# Patient Record
Sex: MALE | Race: ASIAN | NOT HISPANIC OR LATINO | URBAN - METROPOLITAN AREA
[De-identification: names, ages, dates, MRNs, and addresses within clinical notes are randomized per-mention and may not be internally consistent; named-entity substitution may affect disease eponyms.]

---

## 2020-01-31 ENCOUNTER — EMERGENCY (EMERGENCY)
Facility: HOSPITAL | Age: 45
LOS: 1 days | Discharge: LEFT BEFORE DISCHARGE | End: 2020-01-31
Attending: EMERGENCY MEDICINE | Admitting: EMERGENCY MEDICINE
Payer: COMMERCIAL

## 2020-01-31 VITALS
TEMPERATURE: 98 F | OXYGEN SATURATION: 98 % | SYSTOLIC BLOOD PRESSURE: 129 MMHG | RESPIRATION RATE: 16 BRPM | HEIGHT: 73 IN | HEART RATE: 88 BPM | WEIGHT: 214.95 LBS | DIASTOLIC BLOOD PRESSURE: 81 MMHG

## 2020-01-31 DIAGNOSIS — K85.90 ACUTE PANCREATITIS WITHOUT NECROSIS OR INFECTION, UNSPECIFIED: ICD-10-CM

## 2020-01-31 DIAGNOSIS — R10.9 UNSPECIFIED ABDOMINAL PAIN: ICD-10-CM

## 2020-01-31 LAB
CK MB BLD-MCNC: <0.37 % — SIGNIFICANT CHANGE UP
CK MB BLD-MCNC: <0.48 % — SIGNIFICANT CHANGE UP
CK MB CFR SERPL CALC: <0.5 NG/ML — LOW (ref 0.5–3.6)
CK MB CFR SERPL CALC: <0.5 NG/ML — LOW (ref 0.5–3.6)
CK SERPL-CCNC: 104 U/L — SIGNIFICANT CHANGE UP (ref 39–308)
CK SERPL-CCNC: 136 U/L — SIGNIFICANT CHANGE UP (ref 39–308)
TROPONIN I SERPL-MCNC: <0.017 NG/ML — LOW (ref 0.02–0.06)
TROPONIN I SERPL-MCNC: <0.017 NG/ML — LOW (ref 0.02–0.06)

## 2020-01-31 PROCEDURE — 93010 ELECTROCARDIOGRAM REPORT: CPT | Mod: 59

## 2020-01-31 PROCEDURE — 99218: CPT

## 2020-01-31 PROCEDURE — 76705 ECHO EXAM OF ABDOMEN: CPT | Mod: 26

## 2020-01-31 PROCEDURE — 74177 CT ABD & PELVIS W/CONTRAST: CPT | Mod: 26

## 2020-01-31 RX ORDER — MORPHINE SULFATE 50 MG/1
4 CAPSULE, EXTENDED RELEASE ORAL ONCE
Refills: 0 | Status: DISCONTINUED | OUTPATIENT
Start: 2020-01-31 | End: 2020-01-31

## 2020-01-31 RX ORDER — KETOROLAC TROMETHAMINE 30 MG/ML
30 SYRINGE (ML) INJECTION ONCE
Refills: 0 | Status: DISCONTINUED | OUTPATIENT
Start: 2020-01-31 | End: 2020-01-31

## 2020-01-31 RX ORDER — SODIUM CHLORIDE 9 MG/ML
1000 INJECTION INTRAMUSCULAR; INTRAVENOUS; SUBCUTANEOUS ONCE
Refills: 0 | Status: COMPLETED | OUTPATIENT
Start: 2020-01-31 | End: 2020-01-31

## 2020-01-31 RX ORDER — SODIUM CHLORIDE 9 MG/ML
1000 INJECTION INTRAMUSCULAR; INTRAVENOUS; SUBCUTANEOUS
Refills: 0 | Status: COMPLETED | OUTPATIENT
Start: 2020-01-31 | End: 2020-02-01

## 2020-01-31 RX ORDER — HYDROMORPHONE HYDROCHLORIDE 2 MG/ML
1 INJECTION INTRAMUSCULAR; INTRAVENOUS; SUBCUTANEOUS ONCE
Refills: 0 | Status: DISCONTINUED | OUTPATIENT
Start: 2020-01-31 | End: 2020-01-31

## 2020-01-31 RX ADMIN — MORPHINE SULFATE 4 MILLIGRAM(S): 50 CAPSULE, EXTENDED RELEASE ORAL at 18:23

## 2020-01-31 RX ADMIN — MORPHINE SULFATE 4 MILLIGRAM(S): 50 CAPSULE, EXTENDED RELEASE ORAL at 21:20

## 2020-01-31 RX ADMIN — SODIUM CHLORIDE 1000 MILLILITER(S): 9 INJECTION INTRAMUSCULAR; INTRAVENOUS; SUBCUTANEOUS at 18:23

## 2020-01-31 RX ADMIN — SODIUM CHLORIDE 1000 MILLILITER(S): 9 INJECTION INTRAMUSCULAR; INTRAVENOUS; SUBCUTANEOUS at 21:20

## 2020-01-31 NOTE — ED CDU PROVIDER INITIAL DAY NOTE - OBJECTIVE STATEMENT
44 y/o Male with a PMHx of pancreatitis in 2018 due to alcohol presents to the ED for epigastric and periumbilical pain. In 2018 when he was diagnosed with pancreatitis and was also diagnosed with a necrotizing pancreatitis. Few months later, he went back for a follow up CT scan and was cleared from any necrosis in the pancreatitis. In early January 2020 he had a bite of food and developed epigastric tenderness, nausea, and vomiting and attributed it to food poisoning; pain eventually subsided on its own with a bland diet. This week, he developed pain in the epigastric and periumbilical area x2 days, pain has been constant which prompted him to come into the ED. He had a bland diet to help resolve the pain but it did not. Denies fever, chills, nausea, vomiting, diarrhea, and recent alcohol consumption. In 2011, pt had a thyroidectomy.

## 2020-01-31 NOTE — ED PROVIDER NOTE - PROGRESS NOTE DETAILS
Spoke with Pt's PCP, Dr. La, who states patient's last EKG was in 2011 and did have T-Wave inversions in leads III and aVF. During this time in 2011, pt did have a stress test and was noted to have an elevated BP but attributed it to thyroiditis. PCP is aware of patient's case in the ED. pt with (+) pancreatitis on CT scan, pain poorly controlled in ED< multiple times he requested dc home but agreed to stay and be admitted to obs for re-eval in AM.  WIll keep NPO overnight, will discuss case with surgery on call. admit to observation for now.

## 2020-01-31 NOTE — ED PROVIDER NOTE - NS_EDPROVIDERDISPOUSERTYPE_ED_A_ED
Medical Students Attestation (For Medical Student USE Only).../Scribe Attestation (For Scribes USE Only)...

## 2020-01-31 NOTE — ED CDU PROVIDER INITIAL DAY NOTE - MEDICAL DECISION MAKING DETAILS
advised to admit for NPO status IVF and management of acute pancreatitis but pt declined admission, prefers observation instead, will re-eval in AM

## 2020-01-31 NOTE — ED PROVIDER NOTE - OBJECTIVE STATEMENT
46 y/o Male with a PMHx of pancreatitis in 2018 due to alcohol presents to the ED for epigastric and periumbilical pain. In 2018 when he was diagnosed with pancreatitis and was also diagnosed with a necrotizing pancreatitis. Few months later, he went back for a follow up CT scan and was cleared from any necrosis in the pancreatitis. In early January 2020 he had a bite of food and developed epigastric tenderness, nausea, and vomiting and attributed it to food poisoning; pain eventually subsided on its own with a bland diet. This week, he developed pain in the epigastric and periumbilical area x2 days, pain has been constant which prompted him to come into the ED. He had a bland diet to help resolve the pain but it did not. Denies fever, chills, nausea, vomiting, diarrhea, and recent alcohol consumption. In 2011, pt had a thyroidectomy.

## 2020-01-31 NOTE — ED ADULT NURSE NOTE - NSIMPLEMENTINTERV_GEN_ALL_ED
Implemented All Universal Safety Interventions:  [place] to call system. Call bell, personal items and telephone within reach. Instruct patient to call for assistance. Room bathroom lighting operational. Non-slip footwear when patient is off stretcher. Physically safe environment: no spills, clutter or unnecessary equipment. Stretcher in lowest position, wheels locked, appropriate side rails in place.

## 2020-01-31 NOTE — ED PROVIDER NOTE - CLINICAL SUMMARY MEDICAL DECISION MAKING FREE TEXT BOX
46 y/o Male here with constant epigastric tenderness x2 days. Pt with a history of pancreatitis in 2018 related to alcohol. On exam, patient has diffuse abdominal tenderness. Will order morphine for pain control, labs, US, and EKG. Will reassess.    Spoke with Pt's PCP, Dr. La, who states patient's last EKG was in 2011 and did have T-Wave inversions in leads III and aVF. During this time in 2011, pt did have a stress test and was noted to have an elevated BP but attributed it to thyroiditis. PCP is aware of patient's case in the ED.

## 2020-01-31 NOTE — ED ADULT NURSE REASSESSMENT NOTE - NS ED NURSE REASSESS COMMENT FT1
Received patient from KARI Wise. Patient resting in stretcher, endorsing lower abdominal pain. Given 4mg IV morphine per provider order, will reassess pain. Denies n/v, cp, fevers, chills at this time. Pending CT results, plan of care explained.

## 2020-02-01 VITALS
TEMPERATURE: 99 F | SYSTOLIC BLOOD PRESSURE: 128 MMHG | OXYGEN SATURATION: 97 % | HEART RATE: 88 BPM | DIASTOLIC BLOOD PRESSURE: 78 MMHG | RESPIRATION RATE: 18 BRPM

## 2020-02-01 PROCEDURE — 99217: CPT

## 2020-02-01 RX ADMIN — SODIUM CHLORIDE 1000 MILLILITER(S): 9 INJECTION INTRAMUSCULAR; INTRAVENOUS; SUBCUTANEOUS at 02:20

## 2020-02-01 RX ADMIN — SODIUM CHLORIDE 1000 MILLILITER(S): 9 INJECTION INTRAMUSCULAR; INTRAVENOUS; SUBCUTANEOUS at 04:44

## 2020-02-01 RX ADMIN — SODIUM CHLORIDE 250 MILLILITER(S): 9 INJECTION INTRAMUSCULAR; INTRAVENOUS; SUBCUTANEOUS at 03:36

## 2020-02-01 RX ADMIN — SODIUM CHLORIDE 1000 MILLILITER(S): 9 INJECTION INTRAMUSCULAR; INTRAVENOUS; SUBCUTANEOUS at 04:45

## 2020-02-01 RX ADMIN — SODIUM CHLORIDE 250 MILLILITER(S): 9 INJECTION INTRAMUSCULAR; INTRAVENOUS; SUBCUTANEOUS at 00:14

## 2020-02-01 RX ADMIN — SODIUM CHLORIDE 250 MILLILITER(S): 9 INJECTION INTRAMUSCULAR; INTRAVENOUS; SUBCUTANEOUS at 02:36

## 2020-02-01 RX ADMIN — MORPHINE SULFATE 4 MILLIGRAM(S): 50 CAPSULE, EXTENDED RELEASE ORAL at 02:19

## 2020-02-01 RX ADMIN — Medication 30 MILLIGRAM(S): at 02:19

## 2020-02-01 RX ADMIN — Medication 30 MILLIGRAM(S): at 00:14

## 2020-02-01 RX ADMIN — SODIUM CHLORIDE 250 MILLILITER(S): 9 INJECTION INTRAMUSCULAR; INTRAVENOUS; SUBCUTANEOUS at 01:36

## 2020-02-01 NOTE — ED ADULT NURSE REASSESSMENT NOTE - NS ED NURSE REASSESS COMMENT FT1
Fluids completed. Patient states he is feeling much better. No pain or distress noted. Safety and comfort measures in place and maintained.

## 2020-02-01 NOTE — ED CDU PROVIDER DISPOSITION NOTE - NSFOLLOWUPINSTRUCTIONS_ED_ALL_ED_FT
Follow up with your primary care doctor in 1-2 days for follow up eval or return here for repeat evaluation  It is recommended that you stay for admission for your pancreatitis and be monitored on your diet regimen as what you eat/don't eat is particularly important when you have pancreatitis.  Typically, when you have pancreatitis, you need to limit the amount of food intake by mouth to let your bowel rest.  When you feel improved, you can start with small amount of clear liquid, such as water, pedialyte, gatorade for at least 24-48 hours.  Then you can slowly advance your diet (meaning you can try pudding, jello, clear broth).  If you continue to feel better, you can add more food groups into your diet but generally you should avoid greasy food, fatty food, spicy food as they can worsen your symptoms.  Return immediately for any new or worsening symptoms or any new concerns

## 2020-02-01 NOTE — ED CDU PROVIDER DISPOSITION NOTE - CLINICAL COURSE
pt placed on obs for pancreatitis, feeling improved in the AM, tolerating PO in the AM (sips of water).  Pt informed of all lab and radiology results, demonstrates understanding of results.  Pt wants to be discharged despite being advised STRONGLY and REPEATEDLY to stay for admission.  A&Ox3, speaking clearly and coherently, demonstrates decision-making capacity.  Pt understands that the risks of leaving include, but not limited to, death and/or disability; pt accepts these risks.  Pt understands to be seen by PMD in 1-2 days; pt understands to return as soon as possible for persistence or any worsening of symptoms. Pt given opportunity to ask any and all questions.  Pt understands that the ER diagnosis being given today is a preliminary one, and, like many ER diagnoses, is a preliminary impression, often based on limited information, that may change as current Sx's evolve or new Sx's develop

## 2020-02-01 NOTE — ED ADULT NURSE REASSESSMENT NOTE - NS ED NURSE REASSESS COMMENT FT1
Patient states "I feel much better. I wish to go home". Patient is AOx4, in nad, respirations even bilaterally. VSS, refusing admission at this time. Verbalizes understanding of risks and benefits of leaving AMA, states "I understand". Also verbalizes return criteria, endorses will f/u with GI doctor. AMA paperwork signed and scanned.

## 2020-02-01 NOTE — ED CDU PROVIDER DISPOSITION NOTE - PATIENT PORTAL LINK FT
You can access the FollowMyHealth Patient Portal offered by [facility] by registering at the following website: [email or web address]. By joining [employer]’s FollowMyHealth portal, you will also be able to view your health information using other applications (apps) compatible with our system.

## 2021-03-02 NOTE — ED PROVIDER NOTE - CPE EDP ENMT NORM
Next appt 7-23-21  Last appt 12-16-20    Refill request for   Disp Refills Start End    trazodone (DESYREL) 150 MG tablet 30 tablet 3 10/28/2020     Sig - Route: Take 1 tablet by mouth at bedtime. - Oral      Refilled per standing med protocol.     normal...

## 2023-12-15 ENCOUNTER — INPATIENT (INPATIENT)
Facility: HOSPITAL | Age: 48
LOS: 3 days | Discharge: HOME | DRG: 439 | End: 2023-12-19
Attending: HOSPITALIST | Admitting: STUDENT IN AN ORGANIZED HEALTH CARE EDUCATION/TRAINING PROGRAM
Payer: COMMERCIAL

## 2023-12-15 VITALS
HEART RATE: 113 BPM | WEIGHT: 225.09 LBS | RESPIRATION RATE: 16 BRPM | DIASTOLIC BLOOD PRESSURE: 101 MMHG | TEMPERATURE: 98 F | HEIGHT: 74 IN | OXYGEN SATURATION: 96 % | SYSTOLIC BLOOD PRESSURE: 151 MMHG

## 2023-12-15 DIAGNOSIS — K85.90 ACUTE PANCREATITIS WITHOUT NECROSIS OR INFECTION, UNSPECIFIED: ICD-10-CM

## 2023-12-15 DIAGNOSIS — F41.9 ANXIETY DISORDER, UNSPECIFIED: ICD-10-CM

## 2023-12-15 DIAGNOSIS — E78.5 HYPERLIPIDEMIA, UNSPECIFIED: ICD-10-CM

## 2023-12-15 DIAGNOSIS — E89.0 POSTPROCEDURAL HYPOTHYROIDISM: Chronic | ICD-10-CM

## 2023-12-15 DIAGNOSIS — I10 ESSENTIAL (PRIMARY) HYPERTENSION: ICD-10-CM

## 2023-12-15 DIAGNOSIS — E03.9 HYPOTHYROIDISM, UNSPECIFIED: ICD-10-CM

## 2023-12-15 DIAGNOSIS — F10.10 ALCOHOL ABUSE, UNCOMPLICATED: ICD-10-CM

## 2023-12-15 DIAGNOSIS — K76.0 FATTY (CHANGE OF) LIVER, NOT ELSEWHERE CLASSIFIED: ICD-10-CM

## 2023-12-15 LAB
ALBUMIN SERPL ELPH-MCNC: 3.8 G/DL — SIGNIFICANT CHANGE UP (ref 3.3–5)
ALBUMIN SERPL ELPH-MCNC: 3.8 G/DL — SIGNIFICANT CHANGE UP (ref 3.3–5)
ALBUMIN SERPL ELPH-MCNC: 4.2 G/DL — SIGNIFICANT CHANGE UP (ref 3.4–5)
ALBUMIN SERPL ELPH-MCNC: 4.2 G/DL — SIGNIFICANT CHANGE UP (ref 3.4–5)
ALP SERPL-CCNC: 64 U/L — SIGNIFICANT CHANGE UP (ref 40–120)
ALP SERPL-CCNC: 64 U/L — SIGNIFICANT CHANGE UP (ref 40–120)
ALP SERPL-CCNC: 87 U/L — SIGNIFICANT CHANGE UP (ref 40–120)
ALP SERPL-CCNC: 87 U/L — SIGNIFICANT CHANGE UP (ref 40–120)
ALT FLD-CCNC: 21 U/L — SIGNIFICANT CHANGE UP (ref 10–45)
ALT FLD-CCNC: 21 U/L — SIGNIFICANT CHANGE UP (ref 10–45)
ALT FLD-CCNC: SIGNIFICANT CHANGE UP U/L (ref 12–42)
ALT FLD-CCNC: SIGNIFICANT CHANGE UP U/L (ref 12–42)
ANION GAP SERPL CALC-SCNC: 15 MMOL/L — SIGNIFICANT CHANGE UP (ref 5–17)
ANION GAP SERPL CALC-SCNC: 15 MMOL/L — SIGNIFICANT CHANGE UP (ref 5–17)
ANION GAP SERPL CALC-SCNC: 15 MMOL/L — SIGNIFICANT CHANGE UP (ref 9–16)
ANION GAP SERPL CALC-SCNC: 15 MMOL/L — SIGNIFICANT CHANGE UP (ref 9–16)
APPEARANCE UR: CLEAR — SIGNIFICANT CHANGE UP
APPEARANCE UR: CLEAR — SIGNIFICANT CHANGE UP
AST SERPL-CCNC: 32 U/L — SIGNIFICANT CHANGE UP (ref 10–40)
AST SERPL-CCNC: 32 U/L — SIGNIFICANT CHANGE UP (ref 10–40)
AST SERPL-CCNC: SIGNIFICANT CHANGE UP U/L (ref 15–37)
AST SERPL-CCNC: SIGNIFICANT CHANGE UP U/L (ref 15–37)
BASOPHILS # BLD AUTO: 0.06 K/UL — SIGNIFICANT CHANGE UP (ref 0–0.2)
BASOPHILS # BLD AUTO: 0.06 K/UL — SIGNIFICANT CHANGE UP (ref 0–0.2)
BASOPHILS NFR BLD AUTO: 0.4 % — SIGNIFICANT CHANGE UP (ref 0–2)
BASOPHILS NFR BLD AUTO: 0.4 % — SIGNIFICANT CHANGE UP (ref 0–2)
BILIRUB SERPL-MCNC: 1.2 MG/DL — SIGNIFICANT CHANGE UP (ref 0.2–1.2)
BILIRUB SERPL-MCNC: 1.2 MG/DL — SIGNIFICANT CHANGE UP (ref 0.2–1.2)
BILIRUB SERPL-MCNC: 1.4 MG/DL — HIGH (ref 0.2–1.2)
BILIRUB SERPL-MCNC: 1.4 MG/DL — HIGH (ref 0.2–1.2)
BILIRUB UR-MCNC: ABNORMAL
BILIRUB UR-MCNC: ABNORMAL
BUN SERPL-MCNC: 13 MG/DL — SIGNIFICANT CHANGE UP (ref 7–23)
BUN SERPL-MCNC: 13 MG/DL — SIGNIFICANT CHANGE UP (ref 7–23)
BUN SERPL-MCNC: 16 MG/DL — SIGNIFICANT CHANGE UP (ref 7–23)
BUN SERPL-MCNC: 16 MG/DL — SIGNIFICANT CHANGE UP (ref 7–23)
CALCIUM SERPL-MCNC: 7.7 MG/DL — LOW (ref 8.4–10.5)
CALCIUM SERPL-MCNC: 7.7 MG/DL — LOW (ref 8.4–10.5)
CALCIUM SERPL-MCNC: 8.5 MG/DL — SIGNIFICANT CHANGE UP (ref 8.5–10.5)
CALCIUM SERPL-MCNC: 8.5 MG/DL — SIGNIFICANT CHANGE UP (ref 8.5–10.5)
CHLORIDE SERPL-SCNC: 93 MMOL/L — LOW (ref 96–108)
CHLORIDE SERPL-SCNC: 93 MMOL/L — LOW (ref 96–108)
CHLORIDE SERPL-SCNC: 98 MMOL/L — SIGNIFICANT CHANGE UP (ref 96–108)
CHLORIDE SERPL-SCNC: 98 MMOL/L — SIGNIFICANT CHANGE UP (ref 96–108)
CHOLEST SERPL-MCNC: 346 MG/DL — HIGH
CHOLEST SERPL-MCNC: 346 MG/DL — HIGH
CK MB BLD-MCNC: <0.49 % — SIGNIFICANT CHANGE UP
CK MB BLD-MCNC: <0.49 % — SIGNIFICANT CHANGE UP
CK MB CFR SERPL CALC: <0.5 NG/ML — LOW (ref 0.5–3.6)
CK MB CFR SERPL CALC: <0.5 NG/ML — LOW (ref 0.5–3.6)
CK SERPL-CCNC: 103 U/L — SIGNIFICANT CHANGE UP (ref 39–308)
CK SERPL-CCNC: 103 U/L — SIGNIFICANT CHANGE UP (ref 39–308)
CO2 SERPL-SCNC: 20 MMOL/L — LOW (ref 22–31)
CO2 SERPL-SCNC: 20 MMOL/L — LOW (ref 22–31)
CO2 SERPL-SCNC: 21 MMOL/L — LOW (ref 22–31)
CO2 SERPL-SCNC: 21 MMOL/L — LOW (ref 22–31)
COLOR SPEC: YELLOW — SIGNIFICANT CHANGE UP
COLOR SPEC: YELLOW — SIGNIFICANT CHANGE UP
CREAT SERPL-MCNC: 0.83 MG/DL — SIGNIFICANT CHANGE UP (ref 0.5–1.3)
CREAT SERPL-MCNC: 0.83 MG/DL — SIGNIFICANT CHANGE UP (ref 0.5–1.3)
CREAT SERPL-MCNC: 0.93 MG/DL — SIGNIFICANT CHANGE UP (ref 0.5–1.3)
CREAT SERPL-MCNC: 0.93 MG/DL — SIGNIFICANT CHANGE UP (ref 0.5–1.3)
DIFF PNL FLD: ABNORMAL
DIFF PNL FLD: ABNORMAL
EGFR: 101 ML/MIN/1.73M2 — SIGNIFICANT CHANGE UP
EGFR: 101 ML/MIN/1.73M2 — SIGNIFICANT CHANGE UP
EGFR: 107 ML/MIN/1.73M2 — SIGNIFICANT CHANGE UP
EGFR: 107 ML/MIN/1.73M2 — SIGNIFICANT CHANGE UP
EOSINOPHIL # BLD AUTO: 0.02 K/UL — SIGNIFICANT CHANGE UP (ref 0–0.5)
EOSINOPHIL # BLD AUTO: 0.02 K/UL — SIGNIFICANT CHANGE UP (ref 0–0.5)
EOSINOPHIL NFR BLD AUTO: 0.1 % — SIGNIFICANT CHANGE UP (ref 0–6)
EOSINOPHIL NFR BLD AUTO: 0.1 % — SIGNIFICANT CHANGE UP (ref 0–6)
ETHANOL SERPL-MCNC: <3 MG/DL — SIGNIFICANT CHANGE UP
ETHANOL SERPL-MCNC: <3 MG/DL — SIGNIFICANT CHANGE UP
GLUCOSE SERPL-MCNC: 135 MG/DL — HIGH (ref 70–99)
GLUCOSE SERPL-MCNC: 135 MG/DL — HIGH (ref 70–99)
GLUCOSE SERPL-MCNC: 177 MG/DL — HIGH (ref 70–99)
GLUCOSE SERPL-MCNC: 177 MG/DL — HIGH (ref 70–99)
GLUCOSE UR QL: NEGATIVE MG/DL — SIGNIFICANT CHANGE UP
GLUCOSE UR QL: NEGATIVE MG/DL — SIGNIFICANT CHANGE UP
HCT VFR BLD CALC: 44.7 % — SIGNIFICANT CHANGE UP (ref 39–50)
HCT VFR BLD CALC: 44.7 % — SIGNIFICANT CHANGE UP (ref 39–50)
HCT VFR BLD CALC: 48.8 % — SIGNIFICANT CHANGE UP (ref 39–50)
HCT VFR BLD CALC: 48.8 % — SIGNIFICANT CHANGE UP (ref 39–50)
HDLC SERPL-MCNC: 37 MG/DL — LOW
HDLC SERPL-MCNC: 37 MG/DL — LOW
HGB BLD-MCNC: 15.2 G/DL — SIGNIFICANT CHANGE UP (ref 13–17)
HGB BLD-MCNC: 15.2 G/DL — SIGNIFICANT CHANGE UP (ref 13–17)
HGB BLD-MCNC: 17 G/DL — SIGNIFICANT CHANGE UP (ref 13–17)
HGB BLD-MCNC: 17 G/DL — SIGNIFICANT CHANGE UP (ref 13–17)
IMM GRANULOCYTES NFR BLD AUTO: 0.4 % — SIGNIFICANT CHANGE UP (ref 0–0.9)
IMM GRANULOCYTES NFR BLD AUTO: 0.4 % — SIGNIFICANT CHANGE UP (ref 0–0.9)
KETONES UR-MCNC: 40 MG/DL
KETONES UR-MCNC: 40 MG/DL
LACTATE SERPL-SCNC: 1.6 MMOL/L — SIGNIFICANT CHANGE UP (ref 0.5–2)
LACTATE SERPL-SCNC: 1.6 MMOL/L — SIGNIFICANT CHANGE UP (ref 0.5–2)
LEUKOCYTE ESTERASE UR-ACNC: NEGATIVE — SIGNIFICANT CHANGE UP
LEUKOCYTE ESTERASE UR-ACNC: NEGATIVE — SIGNIFICANT CHANGE UP
LIDOCAIN IGE QN: >375 U/L — HIGH (ref 16–77)
LIDOCAIN IGE QN: >375 U/L — HIGH (ref 16–77)
LIPID PNL WITH DIRECT LDL SERPL: SIGNIFICANT CHANGE UP MG/DL
LIPID PNL WITH DIRECT LDL SERPL: SIGNIFICANT CHANGE UP MG/DL
LYMPHOCYTES # BLD AUTO: 0.83 K/UL — LOW (ref 1–3.3)
LYMPHOCYTES # BLD AUTO: 0.83 K/UL — LOW (ref 1–3.3)
LYMPHOCYTES # BLD AUTO: 5.1 % — LOW (ref 13–44)
LYMPHOCYTES # BLD AUTO: 5.1 % — LOW (ref 13–44)
MCHC RBC-ENTMCNC: 31 PG — SIGNIFICANT CHANGE UP (ref 27–34)
MCHC RBC-ENTMCNC: 31 PG — SIGNIFICANT CHANGE UP (ref 27–34)
MCHC RBC-ENTMCNC: 32.1 PG — SIGNIFICANT CHANGE UP (ref 27–34)
MCHC RBC-ENTMCNC: 32.1 PG — SIGNIFICANT CHANGE UP (ref 27–34)
MCHC RBC-ENTMCNC: 34 GM/DL — SIGNIFICANT CHANGE UP (ref 32–36)
MCHC RBC-ENTMCNC: 34 GM/DL — SIGNIFICANT CHANGE UP (ref 32–36)
MCHC RBC-ENTMCNC: 34.8 GM/DL — SIGNIFICANT CHANGE UP (ref 32–36)
MCHC RBC-ENTMCNC: 34.8 GM/DL — SIGNIFICANT CHANGE UP (ref 32–36)
MCV RBC AUTO: 91.2 FL — SIGNIFICANT CHANGE UP (ref 80–100)
MCV RBC AUTO: 91.2 FL — SIGNIFICANT CHANGE UP (ref 80–100)
MCV RBC AUTO: 92.2 FL — SIGNIFICANT CHANGE UP (ref 80–100)
MCV RBC AUTO: 92.2 FL — SIGNIFICANT CHANGE UP (ref 80–100)
MONOCYTES # BLD AUTO: 1.24 K/UL — HIGH (ref 0–0.9)
MONOCYTES # BLD AUTO: 1.24 K/UL — HIGH (ref 0–0.9)
MONOCYTES NFR BLD AUTO: 7.7 % — SIGNIFICANT CHANGE UP (ref 2–14)
MONOCYTES NFR BLD AUTO: 7.7 % — SIGNIFICANT CHANGE UP (ref 2–14)
NEUTROPHILS # BLD AUTO: 13.91 K/UL — HIGH (ref 1.8–7.4)
NEUTROPHILS # BLD AUTO: 13.91 K/UL — HIGH (ref 1.8–7.4)
NEUTROPHILS NFR BLD AUTO: 86.3 % — HIGH (ref 43–77)
NEUTROPHILS NFR BLD AUTO: 86.3 % — HIGH (ref 43–77)
NITRITE UR-MCNC: NEGATIVE — SIGNIFICANT CHANGE UP
NITRITE UR-MCNC: NEGATIVE — SIGNIFICANT CHANGE UP
NON HDL CHOLESTEROL: 309 MG/DL — HIGH
NON HDL CHOLESTEROL: 309 MG/DL — HIGH
NRBC # BLD: 0 /100 WBCS — SIGNIFICANT CHANGE UP (ref 0–0)
PH UR: 5.5 — SIGNIFICANT CHANGE UP (ref 5–8)
PH UR: 5.5 — SIGNIFICANT CHANGE UP (ref 5–8)
PLATELET # BLD AUTO: 230 K/UL — SIGNIFICANT CHANGE UP (ref 150–400)
PLATELET # BLD AUTO: 230 K/UL — SIGNIFICANT CHANGE UP (ref 150–400)
PLATELET # BLD AUTO: 321 K/UL — SIGNIFICANT CHANGE UP (ref 150–400)
PLATELET # BLD AUTO: 321 K/UL — SIGNIFICANT CHANGE UP (ref 150–400)
POTASSIUM SERPL-MCNC: 3.7 MMOL/L — SIGNIFICANT CHANGE UP (ref 3.5–5.3)
POTASSIUM SERPL-MCNC: 3.7 MMOL/L — SIGNIFICANT CHANGE UP (ref 3.5–5.3)
POTASSIUM SERPL-MCNC: 4 MMOL/L — SIGNIFICANT CHANGE UP (ref 3.5–5.3)
POTASSIUM SERPL-MCNC: 4 MMOL/L — SIGNIFICANT CHANGE UP (ref 3.5–5.3)
POTASSIUM SERPL-SCNC: 3.7 MMOL/L — SIGNIFICANT CHANGE UP (ref 3.5–5.3)
POTASSIUM SERPL-SCNC: 3.7 MMOL/L — SIGNIFICANT CHANGE UP (ref 3.5–5.3)
POTASSIUM SERPL-SCNC: 4 MMOL/L — SIGNIFICANT CHANGE UP (ref 3.5–5.3)
POTASSIUM SERPL-SCNC: 4 MMOL/L — SIGNIFICANT CHANGE UP (ref 3.5–5.3)
PROT SERPL-MCNC: 7 G/DL — SIGNIFICANT CHANGE UP (ref 6–8.3)
PROT SERPL-MCNC: 7 G/DL — SIGNIFICANT CHANGE UP (ref 6–8.3)
PROT SERPL-MCNC: 9.4 G/DL — HIGH (ref 6.4–8.2)
PROT SERPL-MCNC: 9.4 G/DL — HIGH (ref 6.4–8.2)
PROT UR-MCNC: 100 MG/DL
PROT UR-MCNC: 100 MG/DL
RBC # BLD: 4.9 M/UL — SIGNIFICANT CHANGE UP (ref 4.2–5.8)
RBC # BLD: 4.9 M/UL — SIGNIFICANT CHANGE UP (ref 4.2–5.8)
RBC # BLD: 5.29 M/UL — SIGNIFICANT CHANGE UP (ref 4.2–5.8)
RBC # BLD: 5.29 M/UL — SIGNIFICANT CHANGE UP (ref 4.2–5.8)
RBC # FLD: 13.3 % — SIGNIFICANT CHANGE UP (ref 10.3–14.5)
RBC # FLD: 13.3 % — SIGNIFICANT CHANGE UP (ref 10.3–14.5)
RBC # FLD: 13.8 % — SIGNIFICANT CHANGE UP (ref 10.3–14.5)
RBC # FLD: 13.8 % — SIGNIFICANT CHANGE UP (ref 10.3–14.5)
RBC CASTS # UR COMP ASSIST: 6 /HPF — HIGH (ref 0–4)
RBC CASTS # UR COMP ASSIST: 6 /HPF — HIGH (ref 0–4)
SODIUM SERPL-SCNC: 129 MMOL/L — LOW (ref 132–145)
SODIUM SERPL-SCNC: 129 MMOL/L — LOW (ref 132–145)
SODIUM SERPL-SCNC: 133 MMOL/L — LOW (ref 135–145)
SODIUM SERPL-SCNC: 133 MMOL/L — LOW (ref 135–145)
SP GR SPEC: 1.01 — SIGNIFICANT CHANGE UP (ref 1–1.03)
SP GR SPEC: 1.01 — SIGNIFICANT CHANGE UP (ref 1–1.03)
TRIGL SERPL-MCNC: 1660 MG/DL — HIGH
TRIGL SERPL-MCNC: 1660 MG/DL — HIGH
TROPONIN I, HIGH SENSITIVITY RESULT: 21.8 NG/L — SIGNIFICANT CHANGE UP
TROPONIN I, HIGH SENSITIVITY RESULT: 21.8 NG/L — SIGNIFICANT CHANGE UP
TROPONIN I, HIGH SENSITIVITY RESULT: 25.2 NG/L — SIGNIFICANT CHANGE UP
TROPONIN I, HIGH SENSITIVITY RESULT: 25.2 NG/L — SIGNIFICANT CHANGE UP
UROBILINOGEN FLD QL: 0.2 MG/DL — SIGNIFICANT CHANGE UP (ref 0.2–1)
UROBILINOGEN FLD QL: 0.2 MG/DL — SIGNIFICANT CHANGE UP (ref 0.2–1)
WBC # BLD: 14.03 K/UL — HIGH (ref 3.8–10.5)
WBC # BLD: 14.03 K/UL — HIGH (ref 3.8–10.5)
WBC # BLD: 16.12 K/UL — HIGH (ref 3.8–10.5)
WBC # BLD: 16.12 K/UL — HIGH (ref 3.8–10.5)
WBC # FLD AUTO: 14.03 K/UL — HIGH (ref 3.8–10.5)
WBC # FLD AUTO: 14.03 K/UL — HIGH (ref 3.8–10.5)
WBC # FLD AUTO: 16.12 K/UL — HIGH (ref 3.8–10.5)
WBC # FLD AUTO: 16.12 K/UL — HIGH (ref 3.8–10.5)
WBC UR QL: 1 /HPF — SIGNIFICANT CHANGE UP (ref 0–5)
WBC UR QL: 1 /HPF — SIGNIFICANT CHANGE UP (ref 0–5)

## 2023-12-15 PROCEDURE — 71045 X-RAY EXAM CHEST 1 VIEW: CPT | Mod: 26

## 2023-12-15 PROCEDURE — 99222 1ST HOSP IP/OBS MODERATE 55: CPT

## 2023-12-15 PROCEDURE — 99285 EMERGENCY DEPT VISIT HI MDM: CPT

## 2023-12-15 PROCEDURE — 74177 CT ABD & PELVIS W/CONTRAST: CPT | Mod: 26

## 2023-12-15 RX ORDER — SODIUM CHLORIDE 9 MG/ML
1000 INJECTION INTRAMUSCULAR; INTRAVENOUS; SUBCUTANEOUS
Refills: 0 | Status: DISCONTINUED | OUTPATIENT
Start: 2023-12-15 | End: 2023-12-16

## 2023-12-15 RX ORDER — HYDROMORPHONE HYDROCHLORIDE 2 MG/ML
1 INJECTION INTRAMUSCULAR; INTRAVENOUS; SUBCUTANEOUS ONCE
Refills: 0 | Status: DISCONTINUED | OUTPATIENT
Start: 2023-12-15 | End: 2023-12-15

## 2023-12-15 RX ORDER — MORPHINE SULFATE 50 MG/1
4 CAPSULE, EXTENDED RELEASE ORAL EVERY 4 HOURS
Refills: 0 | Status: DISCONTINUED | OUTPATIENT
Start: 2023-12-15 | End: 2023-12-15

## 2023-12-15 RX ORDER — LANOLIN ALCOHOL/MO/W.PET/CERES
3 CREAM (GRAM) TOPICAL AT BEDTIME
Refills: 0 | Status: DISCONTINUED | OUTPATIENT
Start: 2023-12-15 | End: 2023-12-19

## 2023-12-15 RX ORDER — MORPHINE SULFATE 50 MG/1
4 CAPSULE, EXTENDED RELEASE ORAL ONCE
Refills: 0 | Status: DISCONTINUED | OUTPATIENT
Start: 2023-12-15 | End: 2023-12-15

## 2023-12-15 RX ORDER — IOHEXOL 300 MG/ML
30 INJECTION, SOLUTION INTRAVENOUS ONCE
Refills: 0 | Status: COMPLETED | OUTPATIENT
Start: 2023-12-15 | End: 2023-12-15

## 2023-12-15 RX ORDER — SODIUM CHLORIDE 9 MG/ML
1000 INJECTION INTRAMUSCULAR; INTRAVENOUS; SUBCUTANEOUS ONCE
Refills: 0 | Status: COMPLETED | OUTPATIENT
Start: 2023-12-15 | End: 2023-12-15

## 2023-12-15 RX ORDER — MORPHINE SULFATE 50 MG/1
4 CAPSULE, EXTENDED RELEASE ORAL EVERY 4 HOURS
Refills: 0 | Status: DISCONTINUED | OUTPATIENT
Start: 2023-12-15 | End: 2023-12-18

## 2023-12-15 RX ORDER — ENOXAPARIN SODIUM 100 MG/ML
40 INJECTION SUBCUTANEOUS EVERY 24 HOURS
Refills: 0 | Status: DISCONTINUED | OUTPATIENT
Start: 2023-12-16 | End: 2023-12-19

## 2023-12-15 RX ORDER — HYDROMORPHONE HYDROCHLORIDE 2 MG/ML
0.5 INJECTION INTRAMUSCULAR; INTRAVENOUS; SUBCUTANEOUS ONCE
Refills: 0 | Status: DISCONTINUED | OUTPATIENT
Start: 2023-12-15 | End: 2023-12-15

## 2023-12-15 RX ORDER — INFLUENZA VIRUS VACCINE 15; 15; 15; 15 UG/.5ML; UG/.5ML; UG/.5ML; UG/.5ML
0.5 SUSPENSION INTRAMUSCULAR ONCE
Refills: 0 | Status: DISCONTINUED | OUTPATIENT
Start: 2023-12-15 | End: 2023-12-19

## 2023-12-15 RX ORDER — LEVOTHYROXINE SODIUM 125 MCG
150 TABLET ORAL DAILY
Refills: 0 | Status: DISCONTINUED | OUTPATIENT
Start: 2023-12-16 | End: 2023-12-19

## 2023-12-15 RX ORDER — ONDANSETRON 8 MG/1
4 TABLET, FILM COATED ORAL ONCE
Refills: 0 | Status: COMPLETED | OUTPATIENT
Start: 2023-12-15 | End: 2023-12-15

## 2023-12-15 RX ADMIN — SODIUM CHLORIDE 1000 MILLILITER(S): 9 INJECTION INTRAMUSCULAR; INTRAVENOUS; SUBCUTANEOUS at 07:48

## 2023-12-15 RX ADMIN — SODIUM CHLORIDE 1000 MILLILITER(S): 9 INJECTION INTRAMUSCULAR; INTRAVENOUS; SUBCUTANEOUS at 17:06

## 2023-12-15 RX ADMIN — SODIUM CHLORIDE 1000 MILLILITER(S): 9 INJECTION INTRAMUSCULAR; INTRAVENOUS; SUBCUTANEOUS at 05:07

## 2023-12-15 RX ADMIN — HYDROMORPHONE HYDROCHLORIDE 1 MILLIGRAM(S): 2 INJECTION INTRAMUSCULAR; INTRAVENOUS; SUBCUTANEOUS at 10:01

## 2023-12-15 RX ADMIN — HYDROMORPHONE HYDROCHLORIDE 1 MILLIGRAM(S): 2 INJECTION INTRAMUSCULAR; INTRAVENOUS; SUBCUTANEOUS at 17:06

## 2023-12-15 RX ADMIN — HYDROMORPHONE HYDROCHLORIDE 1 MILLIGRAM(S): 2 INJECTION INTRAMUSCULAR; INTRAVENOUS; SUBCUTANEOUS at 15:58

## 2023-12-15 RX ADMIN — HYDROMORPHONE HYDROCHLORIDE 1 MILLIGRAM(S): 2 INJECTION INTRAMUSCULAR; INTRAVENOUS; SUBCUTANEOUS at 12:44

## 2023-12-15 RX ADMIN — SODIUM CHLORIDE 150 MILLILITER(S): 9 INJECTION INTRAMUSCULAR; INTRAVENOUS; SUBCUTANEOUS at 21:18

## 2023-12-15 RX ADMIN — ONDANSETRON 4 MILLIGRAM(S): 8 TABLET, FILM COATED ORAL at 05:28

## 2023-12-15 RX ADMIN — MORPHINE SULFATE 4 MILLIGRAM(S): 50 CAPSULE, EXTENDED RELEASE ORAL at 05:07

## 2023-12-15 RX ADMIN — MORPHINE SULFATE 4 MILLIGRAM(S): 50 CAPSULE, EXTENDED RELEASE ORAL at 21:45

## 2023-12-15 RX ADMIN — HYDROMORPHONE HYDROCHLORIDE 1 MILLIGRAM(S): 2 INJECTION INTRAMUSCULAR; INTRAVENOUS; SUBCUTANEOUS at 09:19

## 2023-12-15 RX ADMIN — SODIUM CHLORIDE 1000 MILLILITER(S): 9 INJECTION INTRAMUSCULAR; INTRAVENOUS; SUBCUTANEOUS at 09:20

## 2023-12-15 RX ADMIN — HYDROMORPHONE HYDROCHLORIDE 1 MILLIGRAM(S): 2 INJECTION INTRAMUSCULAR; INTRAVENOUS; SUBCUTANEOUS at 13:14

## 2023-12-15 RX ADMIN — HYDROMORPHONE HYDROCHLORIDE 0.5 MILLIGRAM(S): 2 INJECTION INTRAMUSCULAR; INTRAVENOUS; SUBCUTANEOUS at 06:17

## 2023-12-15 RX ADMIN — IOHEXOL 30 MILLILITER(S): 300 INJECTION, SOLUTION INTRAVENOUS at 05:28

## 2023-12-15 RX ADMIN — HYDROMORPHONE HYDROCHLORIDE 1 MILLIGRAM(S): 2 INJECTION INTRAMUSCULAR; INTRAVENOUS; SUBCUTANEOUS at 10:30

## 2023-12-15 RX ADMIN — HYDROMORPHONE HYDROCHLORIDE 1 MILLIGRAM(S): 2 INJECTION INTRAMUSCULAR; INTRAVENOUS; SUBCUTANEOUS at 08:29

## 2023-12-15 RX ADMIN — MORPHINE SULFATE 4 MILLIGRAM(S): 50 CAPSULE, EXTENDED RELEASE ORAL at 21:15

## 2023-12-15 NOTE — ED ADULT NURSE REASSESSMENT NOTE - NS ED NURSE REASSESS COMMENT FT1
pt reported partial improvement of pain s/p morphine administration. Reports increase of abdominal pain and distension, 10/10 after drinking oral contrast. MD Coronado made aware, medicated as per order.

## 2023-12-15 NOTE — ED ADULT TRIAGE NOTE - GLASGOW COMA SCALE: BEST VERBAL RESPONSE, MLM
"""Continue Restasis both eyes twice a day ."" ""Continue Artificial tears both eyes two - four times a day . """ (V5) oriented

## 2023-12-15 NOTE — H&P ADULT - PROBLEM SELECTOR PLAN 1
Pt p/w epigastric pain radiating to back, attributes to past episodes of pancreatitis, Lipase >375. Likely 2/2 chronic etoh consumption (reports 3-4 glasses of wine multiple days/week), states he had stopped drinking for 1 year following initial dx w pancreatitis (for which pt was hospitalized at [facility] for 10 days), but began to drink again during the pandemic.   - Dilauded 0.5mg IV q4h PRN  - 0.9% NS at 10cc/kg/h  - monitor Hct (goal  35-44%)  -monitor BUN (increase IVF if BUN remains the same or increases)  - follow up repeat CBC, CMP, Triglycerides, Lactate  - clear liquid diet, advance as tolerated  - Calculate [place]ien's Criteria (pending repeat CMP, aminotransferase sample lipemic) Pt p/w epigastric pain radiating to back, attributes to past episodes of pancreatitis, Lipase >375. Likely 2/2 chronic etoh consumption (reports 3-4 glasses of wine multiple days/week), states he had stopped drinking for 1 year following initial dx w pancreatitis (for which pt was hospitalized at [facility] for 10 days), but began to drink again during the pandemic.   - Dilauded 0.5mg IV q4h PRN  - 0.9% NS at 10cc/kg/h  - monitor Hct (goal  35-44%)  -monitor BUN (increase IVF if BUN remains the same or increases)  - follow up repeat CBC, CMP, Triglycerides, Lactate  - clear liquid diet, advance as tolerated  - Calculate Tien's Criteria (pending repeat CMP, aminotransferase sample lipemic) Pt p/w epigastric pain radiating to back, attributes to past episodes of pancreatitis, Lipase >375. Likely 2/2 chronic etoh consumption (reports 3-4 glasses of wine multiple days/week), states he had stopped drinking for 1 year following initial dx w pancreatitis (for which pt was hospitalized at [facility] for 10 days), but began to drink again during the pandemic.   - iv morphine 14h prn  - 0.9% NS at 10cc/kg/h  - monitor Hct (goal  35-44%)  -monitor BUN (increase IVF if BUN remains the same or increases)  - follow up repeat CBC, CMP, Triglycerides, Lactate  - clear liquid diet, advance as tolerated  - Calculate [place]'s Criteria (pending repeat CMP, aminotransferase sample lipemic) Pt p/w epigastric pain radiating to back, attributes to past episodes of pancreatitis, Lipase >375. Likely 2/2 chronic etoh consumption (reports 3-4 glasses of wine multiple days/week), states he had stopped drinking for 1 year following initial dx w pancreatitis (for which pt was hospitalized at [facility] for 10 days), but began to drink again during the pandemic.   - iv morphine 14h prn  - 0.9% NS at 10cc/kg/h  - monitor Hct (goal  35-44%)  -monitor BUN (increase IVF if BUN remains the same or increases)  - follow up repeat CBC, CMP, Triglycerides, Lactate  - clear liquid diet, advance as tolerated  - Calculate [place]'s Criteria (pending repeat CMP, aminotransferase sample lipemic)

## 2023-12-15 NOTE — H&P ADULT - HISTORY OF PRESENT ILLNESS
48-year-old male with history of EtOH pancreatitis in the past, as well as PMHx hypothyroidism, HTN, HLD, & anxiety, who presents with epigastric pain x2 days. Per pt, he has been diagnosed with alcoholic pancreatitis twice in the past, with his initial diagnosis requiring hospitalization x10 days (on subsequent occasion pt went home from ED). He explains that he drinks "a lot" whenever he does drink, around 3-4 glasses of wine, but that he does not drink daily. Following his first bout of pancreatitis, states he quit drinking for 1 year but began consuming etoh again during covid, last drink several days ago.  He explains that his current symptoms began 2 days ago after eating a meal, and that he felt so uncomfortably that he forced himself to vomit in an effort to alleviate his nausea. Other than an additional episode of induced emesis today, he has not have any other episodes of vomiting. He reports last BM the morning of sx onset that was normal in color, but none since then & denies passing gas. Pt also states that he has had decreased urination with darkened urine that has been improving since his ED arrival. When he presented to the ED he states that he felt pain and fullness in his stomach even after 1 sip of water.  In the ED he was tachycardic, found to have TWI in II, III, aVF, V4-6 (consistent w past EKG in 2020). CT consistent w acute pancreatitis, w possible small early necrotic change at the junction, as well as hepatic steatosis, no radioopaque gallstones, no bowel obstruction.  CXR w cardiomegaly  Labs significant for elevated WBC, hyponatremia 129, cl 93, bicarb 21, protein 9.4, and lipase >375, UA w small bilirubin, ketones 40, elevated protein and trace blood   48-year-old male with history of EtOH pancreatitis in the past, as well as PMHx hypothyroidism, HTN, HLD, & anxiety, who presents with epigastric pain x2 days. Per pt, he has been diagnosed with alcoholic pancreatitis twice in the past, with his initial diagnosis requiring hospitalization x10 days (on subsequent occasion pt went home from ED). He explains that he drinks "a lot" whenever he does drink, around 3-4 glasses of wine, but that he does not drink daily. Following his first bout of pancreatitis, states he quit drinking for 1 year but began consuming etoh again during covid, last drink several days ago.  He explains that his current symptoms began 2 days ago after eating a meal, and that he felt so uncomfortably that he forced himself to vomit in an effort to alleviate his nausea. Other than an additional episode of induced emesis today, he has not have any other episodes of vomiting. He reports last BM the morning of sx onset that was normal in color, but none since then & denies passing gas. Pt also states that he has had decreased urination with darkened urine that has been improving since his ED arrival. When he presented to the ED he states that he felt pain and fullness in his stomach even after 1 sip of water.  In the ED he was tachycardic, found to have TWI in II, III, aVF, V4-6 (consistent w past EKG in 2020). CT consistent w acute pancreatitis, w possible small early necrotic change at the junction, as well as hepatic steatosis, no radioopaque gallstones, no bowel obstruction, CXR w cardiomegaly.  Labs significant for elevated WBC, hyponatremia 129, cl 93, bicarb 21, protein 9.4, and lipase >375, UA w small bilirubin, ketones 40, elevated protein and trace blood   48-year-old male with history of EtOH pancreatitis in the past, as well as PMHx hypothyroidism, HTN, HLD, & anxiety, who presents with epigastric pain x2 days. Per pt, he has been diagnosed with alcoholic pancreatitis twice in the past, with his initial diagnosis requiring hospitalization x10 days (on subsequent occasion pt went home from ED). He explains that he drinks "a lot" whenever he does drink, around 3-4 glasses of wine, but that he does not drink daily. Following his first bout of pancreatitis, states he quit drinking for 1 year but began consuming etoh again during covid, last drink several days ago.  He explains that his current symptoms began 2 days ago after eating a meal, and that he felt so uncomfortably that he forced himself to vomit in an effort to alleviate his nausea. Other than an additional episode of induced emesis today, he has not have any other episodes of vomiting. He reports last BM the morning of sx onset that was normal in color, but none since then & denies passing gas. Pt also states that he has had decreased urination with darkened urine that has been improving since his ED arrival. When he presented to the ED he states that he felt pain and fullness in his stomach even after 1 sip of water.  In the ED he was tachycardic, found to have TWI in II, III, aVF, V4-6 (consistent w past EKG in 2020). CT consistent w acute pancreatitis, w possible small early necrotic change at the junction, as well as hepatic steatosis, no radioopaque gallstones, no bowel obstruction, CXR w cardiomegaly.  Labs significant for elevated WBC, hyponatremia 129, cl 93, bicarb 21, protein 9.4, and lipase >375, UA w small bilirubin, ketones 40, elevated protein and trace blood, danette <3

## 2023-12-15 NOTE — H&P ADULT - ATTENDING COMMENTS
I have seen and examined after the resident saw the patient. I provided a substantive portion of the care of this patient. I personally performed the medical decision making in its entirety, for this encounter. I agree with the history, physical, exam, assessment and plan as noted by Dr. Karen Riggs with the following attestations.     PHYSICAL EXAM:  General: male, appears comfortable, in no apparent distress  HEENT:  NC/AT, EOMI, neck is supple, no scleral icterus  Pulmonary: Clear to auscultation bilaterally, no wheezes / rales  CVS:  RRR, S1 and S2, no murmur  Gastrointestinal:  distended, decreased bowel sounds, mild tenderness to deep palptation  Musculoskeletal: Moves all extremities, no lower extremity edema  Neurologic:  Alert and oriented, no focal neurological deficits  Skin:  No rashes, good turgor    ASSESSMENT AND PLAN:     41to M     #  #  #    DVT prophylaxis:  lovenox  Disposition: Anticipate discharge in 2-3day(s) I have seen and examined after the resident saw the patient. I provided a substantive portion of the care of this patient. I personally performed the medical decision making in its entirety, for this encounter. I agree with the history, physical, exam, assessment and plan as noted by Dr. Karen Riggs with the following attestations.     PHYSICAL EXAM:  General: male, appears comfortable, in no apparent distress  HEENT:  NC/AT, EOMI, neck is supple, no scleral icterus  Pulmonary: Clear to auscultation bilaterally, no wheezes / rales  CVS:  RRR, S1 and S2, no murmur  Gastrointestinal:  distended, decreased bowel sounds, mild tenderness to deep palpation  Musculoskeletal: Moves all extremities, no lower extremity edema  Neurologic:  Alert and oriented, no focal neurological deficits  Skin:  No rashes, good turgor    ASSESSMENT AND PLAN:     41to M h/o ETOH pancreatitis, ETOH use disorder, hypothyroidism, HTN, HLD, anxiety, admitted with pancreatitis with necrotic change at junction     #ETOH related pancreatitis  NPO for now  Morphine 4mg IV q4 hrs PRN   IVF 150cc/hr   CIWA protocol  Check Alkphosphatase in AM  Given some amount of necrotic change seen on CT scan, will consider GI consultation if condition worsens  Continue with home synthroid     DVT prophylaxis:  lovenox  Disposition: Anticipate discharge in 2-3day(s)

## 2023-12-15 NOTE — H&P ADULT - ASSESSMENT
48M w PMHx alcohol-induced pancreatitis, hypothyroidism, HTN, HLD, and anxiety, presenting with complaint of epigastric pain x2 days, admitted for management of alcoholic pancreatitis.

## 2023-12-15 NOTE — H&P ADULT - NSICDXPASTMEDICALHX_GEN_ALL_CORE_FT
PAST MEDICAL HISTORY:  Anxiety     HLD (hyperlipidemia)     HTN (hypertension)     Hypothyroid     Pancreatitis

## 2023-12-15 NOTE — H&P ADULT - PROBLEM SELECTOR PLAN 2
hepatosteatosis on ct  - consider liver ultrasound  - follow up cmp results for fib-4 index  - offer alcohol use cessation counseling (see below)

## 2023-12-15 NOTE — H&P ADULT - PROBLEM SELECTOR PLAN 4
PMHx hyperthyroidism, s/p total thyroidectomy for malignant nodule, takes synthroid 150mcg every morning except sundays, on which days pt reports he takes 300mcg  - continue synthroid 150mg qd PMHx hyperthyroidism, s/p total thyroidectomy for malignant nodule, takes synthroid 150mcg every morning except sundays, on which days pt reports he takes 300mcg  - continue synthroid 150mg qd  - follow up am tsh PMHx hyperthyroidism, s/p total thyroidectomy for malignant nodule, takes synthroid 150mcg every morning except sundays, on which days pt reports he takes 300mcg  - continue synthroid 150mcg qd  - follow up am tsh

## 2023-12-15 NOTE — ED PROVIDER NOTE - PHYSICAL EXAMINATION
Constitutional: awake and alert, uncomfortable appearing  HEENT: head normocephalic and atraumatic. moist mucous membranes  Eyes: extraocular movements intact, normal conjunctiva  Neck: supple, normal ROM  Cardiovascular: regular rate   Pulmonary: no respiratory distress  Gastrointestinal: abdomen diffusely distended and diffusely TTP (primarily in epigastrium)  Skin: warm, dry, normal for ethnicity  Musculoskeletal: no edema, no deformity  Neurological: oriented x4, no focal neurologic deficit.   Psychiatric: calm and cooperative

## 2023-12-15 NOTE — H&P ADULT - PROBLEM SELECTOR PLAN 6
PMHx HLD, follows regularly w PCP, takes rosuvastatin at home  - follow up formal med rec in am  - resume home medications pending dose confirmation PMHx HLD, follows regularly w PCP, takes rosuvastatin at home  - follow up formal med rec in am  - hold rosuvastatin until cmp results, otherwise resume home medications pending dose confirmation

## 2023-12-15 NOTE — PATIENT PROFILE ADULT - NSTOBACCO TYPE_GEN_A_CORE_RD
"Chief Complaint   Patient presents with     Referral     neurology     initial /91 mmHg  Pulse 108  Temp(Src) 98.4  F (36.9  C) (Oral)  Resp 16  Ht 5' 3.5\" (1.613 m)  Wt 154 lb (69.854 kg)  BMI 26.85 kg/m2  SpO2 97%  LMP 09/01/2000 Estimated body mass index is 26.85 kg/(m^2) as calculated from the following:    Height as of this encounter: 5' 3.5\" (1.613 m).    Weight as of this encounter: 154 lb (69.854 kg).  BP completed using cuff size: regular.  L  arm      Health Maintenance that is potentially due pending provider review:  NONE    n/a    Jose Mitchell ma  "
Cigarettes

## 2023-12-15 NOTE — ED ADULT NURSE REASSESSMENT NOTE - NS ED NURSE REASSESS COMMENT FT1
pt endorsed to me in NAD, reporting abdominal pain - pain medication administered as ordered. VSS. will continue to monitor.

## 2023-12-15 NOTE — H&P ADULT - PROBLEM SELECTOR PLAN 5
PMHx HTN, follows regularly w PCP (Dr. La?), home medications include propanolol 20mg qhs, losartan (unsure of dose)  - follow up formal med rec in am  - resume home medications pending dose confirmation

## 2023-12-15 NOTE — H&P ADULT - NSHPPHYSICALEXAM_GEN_ALL_CORE
Constitutional: awake and alert, uncomfortable appearing  HEENT: head normocephalic and atraumatic. moist mucous membranes  Eyes: extraocular movements intact, normal conjunctiva  Neck: supple, normal ROM  Cardiovascular: regular rate   Pulmonary: no respiratory distress  Gastrointestinal: abdomen diffusely distended with TTP of the epigastrium  Skin: warm, dry  Musculoskeletal: no edema, no deformity  Neurological: oriented x4, no focal neurologic deficit.   Psychiatric: calm and cooperative

## 2023-12-15 NOTE — ED ADULT TRIAGE NOTE - CHIEF COMPLAINT QUOTE
male patient walk in to ED for eval of epigastric pain x 16 hrs w/ hx of pancreatitis; hypothyroidism, HTN. patient states last meal 11 am yesterday. abdomen distended negative rebound tenderness. patient induced vomiting and reports green watery vomitus

## 2023-12-15 NOTE — PATIENT PROFILE ADULT - FALL HARM RISK - UNIVERSAL INTERVENTIONS
Bed in lowest position, wheels locked, appropriate side rails in place/Call bell, personal items and telephone in reach/Instruct patient to call for assistance before getting out of bed or chair/Non-slip footwear when patient is out of bed/[place] to call system/Physically safe environment - no spills, clutter or unnecessary equipment/Purposeful Proactive Rounding/Room/bathroom lighting operational, light cord in reach Bed in lowest position, wheels locked, appropriate side rails in place/Call bell, personal items and telephone in reach/Instruct patient to call for assistance before getting out of bed or chair/Non-slip footwear when patient is out of bed/[place] to call system/Physically safe environment - no spills, clutter or unnecessary equipment/Purposeful Proactive Rounding/Room/bathroom lighting operational, light cord in reach

## 2023-12-15 NOTE — H&P ADULT - PROBLEM SELECTOR PLAN 3
pt endorses long hx of alcohol consumption, currently repots consuming moderate amounts of wine multiple days per week. quit drinking x1 year but resumed etoh consumption during the pandemic.  pt offered referrals to counseling services, as he acknowledges his etoh consumption is problematic, plans to stop drinking & states he feels he is equipped to do so on his own, as he has done in the past. offered counseling resources/referrals but pt is not currently interested. encouraged to reach out for services as desired  ketonuria likely from acute alcohol consumption versus prolonged starvation  - consider thiamine, b12, folate supplementation  - pt's reported last drink several days ago puts him out of window of acute withdrawal, denies any hx of wd sx, will conitnue to monitor pt endorses long hx of alcohol consumption, currently repots consuming moderate amounts of wine multiple days per week. quit drinking x1 year but resumed etoh consumption during the pandemic.  pt offered referrals to counseling services, as he acknowledges his etoh consumption is problematic, plans to stop drinking & states he feels he is equipped to do so on his own, as he has done in the past. offered counseling resources/referrals but pt is not currently interested. encouraged to reach out for services as desired  ketonuria likely from acute alcohol consumption versus prolonged starvation  - consider thiamine, b12, folate supplementation  - pt's reported last drink several days ago puts him out of window of acute withdrawal, denies any hx of wd sx, will conitnue to monitor w low-risk ciwa protocol ordered x24h pt endorses long hx of alcohol consumption, currently repots consuming moderate amounts of wine multiple days per week. quit drinking x1 year but resumed etoh consumption during the pandemic. ed danette <3  pt offered referrals to counseling services, as he acknowledges his etoh consumption is problematic, plans to stop drinking & states he feels he is equipped to do so on his own, as he has done in the past. offered counseling resources/referrals but pt is not currently interested. encouraged to reach out for services as desired  ketonuria likely from acute alcohol consumption versus prolonged starvation  - consider thiamine, b12, folate supplementation  - pt's reported last drink several days ago puts him out of window of acute withdrawal, denies any hx of wd sx, will continue to monitor w low-risk ciwa protocol ordered x24h pt endorses long hx of alcohol consumption, currently repots consuming moderate amounts of wine multiple days per week. quit drinking x1 year but resumed etoh consumption during the pandemic. ed danette <3, ciwa score 0  pt offered referrals to counseling services, as he acknowledges his etoh consumption is problematic, plans to stop drinking & states he feels he is equipped to do so on his own, as he has done in the past. offered counseling resources/referrals but pt is not currently interested. encouraged to reach out for services as desired  ketonuria likely from acute alcohol consumption versus prolonged starvation  - consider thiamine, b12, folate supplementation  - pt's reported last drink several days ago puts him out of window of acute withdrawal, denies any hx of wd sx, will continue to monitor w low-risk ciwa protocol ordered x24h

## 2023-12-15 NOTE — ED PROVIDER NOTE - OBJECTIVE STATEMENT
48-year-old male with history of EtOH pancreatitis presents with epigastric pain for 2 days.  Patient states he feels full and has pain after even taking 1 sip of water.  Induced vomiting 1 time, otherwise no nausea or vomiting.  No fever or chills.  Has not had a bowel movement since onset of pain.  Has not passed gas since on side of pain.  No history of abdominal surgery in the past.  No history of SBO in the past.  Last EtOH use several days ago.

## 2023-12-15 NOTE — H&P ADULT - NSHPLABSRESULTS_GEN_ALL_CORE
17.0   16.12 )-----------( 321      ( 15 Dec 2023 05:00 )             48.8     12-15    129<L>  |  93<L>  |  16  ----------------------------<  177<H>  3.7   |  21<L>  |  0.93    Ca    8.5      15 Dec 2023 05:00    TPro  9.4<H>  /  Alb  4.2  /  TBili  1.2  /  DBili  x   /  AST  see note  /  ALT  see note  /  AlkPhos  87  12-15      Urinalysis Basic - ( 15 Dec 2023 05:00 )    Color: Yellow / Appearance: Clear / S.015 / pH: x  Gluc: 177 mg/dL / Ketone: 40 mg/dL  / Bili: Small / Urobili: 0.2 mg/dL   Blood: x / Protein: 100 mg/dL / Nitrite: Negative   Leuk Esterase: Negative / RBC: 6 /HPF / WBC 1 /HPF   Sq Epi: x / Non Sq Epi: x / Bacteria: x      RADIOLOGY & ADDITIONAL TESTS: Reviewed

## 2023-12-15 NOTE — ED ADULT NURSE NOTE - OBJECTIVE STATEMENT
Pt is a 48y male c/o abdominal pain. Pt states x18 hr upper abdominal pain w/ distension. Pt denies N/V other than inducing vomit 1x d/t feeling bloated after having 1 sip of water. Abdomen dissented, denies ttp. AAOx4, respirations even and unlabored, tachycardic on exam. Reports hx of alcoholic pancreatitis.

## 2023-12-15 NOTE — ED PROVIDER NOTE - CLINICAL SUMMARY MEDICAL DECISION MAKING FREE TEXT BOX
Patient with history of EtOH pancreatitis presents with epigastric pain for 2 days associated with no BMs and not passing gas.  On exam, patient is afebrile, vital signs are stable.  Patient is uncomfortable appearing with diffuse abdominal distention and primarily epigastric tenderness to palpation.  Differential includes pancreatitis, SBO, biliary colic, cholecystitis, PUD, other.  Plan for labs, analgesia, CT abdomen/pelvis, monitor, reassess.

## 2023-12-15 NOTE — H&P ADULT - PROBLEM SELECTOR PLAN 7
Pt prescribed valium, of which he reports taking 2.5mg on rare occasions when unable to sleep  - prn melatonin for insomnia while inpatient

## 2023-12-16 DIAGNOSIS — E83.51 HYPOCALCEMIA: ICD-10-CM

## 2023-12-16 DIAGNOSIS — E87.1 HYPO-OSMOLALITY AND HYPONATREMIA: ICD-10-CM

## 2023-12-16 DIAGNOSIS — D72.829 ELEVATED WHITE BLOOD CELL COUNT, UNSPECIFIED: ICD-10-CM

## 2023-12-16 LAB
ALBUMIN SERPL ELPH-MCNC: 4 G/DL — SIGNIFICANT CHANGE UP (ref 3.3–5)
ALBUMIN SERPL ELPH-MCNC: 4 G/DL — SIGNIFICANT CHANGE UP (ref 3.3–5)
ALP SERPL-CCNC: 64 U/L — SIGNIFICANT CHANGE UP (ref 40–120)
ALP SERPL-CCNC: 64 U/L — SIGNIFICANT CHANGE UP (ref 40–120)
ALT FLD-CCNC: 17 U/L — SIGNIFICANT CHANGE UP (ref 10–45)
ALT FLD-CCNC: 17 U/L — SIGNIFICANT CHANGE UP (ref 10–45)
ANION GAP SERPL CALC-SCNC: 13 MMOL/L — SIGNIFICANT CHANGE UP (ref 5–17)
ANION GAP SERPL CALC-SCNC: 13 MMOL/L — SIGNIFICANT CHANGE UP (ref 5–17)
AST SERPL-CCNC: 25 U/L — SIGNIFICANT CHANGE UP (ref 10–40)
AST SERPL-CCNC: 25 U/L — SIGNIFICANT CHANGE UP (ref 10–40)
BASOPHILS # BLD AUTO: 0.05 K/UL — SIGNIFICANT CHANGE UP (ref 0–0.2)
BASOPHILS # BLD AUTO: 0.05 K/UL — SIGNIFICANT CHANGE UP (ref 0–0.2)
BASOPHILS NFR BLD AUTO: 0.4 % — SIGNIFICANT CHANGE UP (ref 0–2)
BASOPHILS NFR BLD AUTO: 0.4 % — SIGNIFICANT CHANGE UP (ref 0–2)
BILIRUB SERPL-MCNC: 2.2 MG/DL — HIGH (ref 0.2–1.2)
BILIRUB SERPL-MCNC: 2.2 MG/DL — HIGH (ref 0.2–1.2)
BUN SERPL-MCNC: 13 MG/DL — SIGNIFICANT CHANGE UP (ref 7–23)
BUN SERPL-MCNC: 13 MG/DL — SIGNIFICANT CHANGE UP (ref 7–23)
CALCIUM SERPL-MCNC: 8.2 MG/DL — LOW (ref 8.4–10.5)
CALCIUM SERPL-MCNC: 8.2 MG/DL — LOW (ref 8.4–10.5)
CHLORIDE SERPL-SCNC: 97 MMOL/L — SIGNIFICANT CHANGE UP (ref 96–108)
CHLORIDE SERPL-SCNC: 97 MMOL/L — SIGNIFICANT CHANGE UP (ref 96–108)
CO2 SERPL-SCNC: 24 MMOL/L — SIGNIFICANT CHANGE UP (ref 22–31)
CO2 SERPL-SCNC: 24 MMOL/L — SIGNIFICANT CHANGE UP (ref 22–31)
CREAT SERPL-MCNC: 0.91 MG/DL — SIGNIFICANT CHANGE UP (ref 0.5–1.3)
CREAT SERPL-MCNC: 0.91 MG/DL — SIGNIFICANT CHANGE UP (ref 0.5–1.3)
CULTURE RESULTS: SIGNIFICANT CHANGE UP
CULTURE RESULTS: SIGNIFICANT CHANGE UP
EGFR: 103 ML/MIN/1.73M2 — SIGNIFICANT CHANGE UP
EGFR: 103 ML/MIN/1.73M2 — SIGNIFICANT CHANGE UP
EOSINOPHIL # BLD AUTO: 0.04 K/UL — SIGNIFICANT CHANGE UP (ref 0–0.5)
EOSINOPHIL # BLD AUTO: 0.04 K/UL — SIGNIFICANT CHANGE UP (ref 0–0.5)
EOSINOPHIL NFR BLD AUTO: 0.3 % — SIGNIFICANT CHANGE UP (ref 0–6)
EOSINOPHIL NFR BLD AUTO: 0.3 % — SIGNIFICANT CHANGE UP (ref 0–6)
GLUCOSE SERPL-MCNC: 138 MG/DL — HIGH (ref 70–99)
GLUCOSE SERPL-MCNC: 138 MG/DL — HIGH (ref 70–99)
HCT VFR BLD CALC: 45.1 % — SIGNIFICANT CHANGE UP (ref 39–50)
HCT VFR BLD CALC: 45.1 % — SIGNIFICANT CHANGE UP (ref 39–50)
HGB BLD-MCNC: 14.8 G/DL — SIGNIFICANT CHANGE UP (ref 13–17)
HGB BLD-MCNC: 14.8 G/DL — SIGNIFICANT CHANGE UP (ref 13–17)
IMM GRANULOCYTES NFR BLD AUTO: 0.4 % — SIGNIFICANT CHANGE UP (ref 0–0.9)
IMM GRANULOCYTES NFR BLD AUTO: 0.4 % — SIGNIFICANT CHANGE UP (ref 0–0.9)
LYMPHOCYTES # BLD AUTO: 0.86 K/UL — LOW (ref 1–3.3)
LYMPHOCYTES # BLD AUTO: 0.86 K/UL — LOW (ref 1–3.3)
LYMPHOCYTES # BLD AUTO: 6.4 % — LOW (ref 13–44)
LYMPHOCYTES # BLD AUTO: 6.4 % — LOW (ref 13–44)
MAGNESIUM SERPL-MCNC: 1.7 MG/DL — SIGNIFICANT CHANGE UP (ref 1.6–2.6)
MAGNESIUM SERPL-MCNC: 1.7 MG/DL — SIGNIFICANT CHANGE UP (ref 1.6–2.6)
MCHC RBC-ENTMCNC: 30.9 PG — SIGNIFICANT CHANGE UP (ref 27–34)
MCHC RBC-ENTMCNC: 30.9 PG — SIGNIFICANT CHANGE UP (ref 27–34)
MCHC RBC-ENTMCNC: 32.8 GM/DL — SIGNIFICANT CHANGE UP (ref 32–36)
MCHC RBC-ENTMCNC: 32.8 GM/DL — SIGNIFICANT CHANGE UP (ref 32–36)
MCV RBC AUTO: 94.2 FL — SIGNIFICANT CHANGE UP (ref 80–100)
MCV RBC AUTO: 94.2 FL — SIGNIFICANT CHANGE UP (ref 80–100)
MONOCYTES # BLD AUTO: 1.07 K/UL — HIGH (ref 0–0.9)
MONOCYTES # BLD AUTO: 1.07 K/UL — HIGH (ref 0–0.9)
MONOCYTES NFR BLD AUTO: 8 % — SIGNIFICANT CHANGE UP (ref 2–14)
MONOCYTES NFR BLD AUTO: 8 % — SIGNIFICANT CHANGE UP (ref 2–14)
NEUTROPHILS # BLD AUTO: 11.37 K/UL — HIGH (ref 1.8–7.4)
NEUTROPHILS # BLD AUTO: 11.37 K/UL — HIGH (ref 1.8–7.4)
NEUTROPHILS NFR BLD AUTO: 84.5 % — HIGH (ref 43–77)
NEUTROPHILS NFR BLD AUTO: 84.5 % — HIGH (ref 43–77)
NRBC # BLD: 0 /100 WBCS — SIGNIFICANT CHANGE UP (ref 0–0)
NRBC # BLD: 0 /100 WBCS — SIGNIFICANT CHANGE UP (ref 0–0)
PHOSPHATE SERPL-MCNC: 1.8 MG/DL — LOW (ref 2.5–4.5)
PHOSPHATE SERPL-MCNC: 1.8 MG/DL — LOW (ref 2.5–4.5)
PLATELET # BLD AUTO: 237 K/UL — SIGNIFICANT CHANGE UP (ref 150–400)
PLATELET # BLD AUTO: 237 K/UL — SIGNIFICANT CHANGE UP (ref 150–400)
POTASSIUM SERPL-MCNC: 3.8 MMOL/L — SIGNIFICANT CHANGE UP (ref 3.5–5.3)
POTASSIUM SERPL-MCNC: 3.8 MMOL/L — SIGNIFICANT CHANGE UP (ref 3.5–5.3)
POTASSIUM SERPL-SCNC: 3.8 MMOL/L — SIGNIFICANT CHANGE UP (ref 3.5–5.3)
POTASSIUM SERPL-SCNC: 3.8 MMOL/L — SIGNIFICANT CHANGE UP (ref 3.5–5.3)
PROT SERPL-MCNC: 7.4 G/DL — SIGNIFICANT CHANGE UP (ref 6–8.3)
PROT SERPL-MCNC: 7.4 G/DL — SIGNIFICANT CHANGE UP (ref 6–8.3)
RBC # BLD: 4.79 M/UL — SIGNIFICANT CHANGE UP (ref 4.2–5.8)
RBC # BLD: 4.79 M/UL — SIGNIFICANT CHANGE UP (ref 4.2–5.8)
RBC # FLD: 14 % — SIGNIFICANT CHANGE UP (ref 10.3–14.5)
RBC # FLD: 14 % — SIGNIFICANT CHANGE UP (ref 10.3–14.5)
SODIUM SERPL-SCNC: 134 MMOL/L — LOW (ref 135–145)
SODIUM SERPL-SCNC: 134 MMOL/L — LOW (ref 135–145)
SPECIMEN SOURCE: SIGNIFICANT CHANGE UP
SPECIMEN SOURCE: SIGNIFICANT CHANGE UP
TSH SERPL-MCNC: 3.02 UIU/ML — SIGNIFICANT CHANGE UP (ref 0.27–4.2)
TSH SERPL-MCNC: 3.02 UIU/ML — SIGNIFICANT CHANGE UP (ref 0.27–4.2)
WBC # BLD: 13.45 K/UL — HIGH (ref 3.8–10.5)
WBC # BLD: 13.45 K/UL — HIGH (ref 3.8–10.5)
WBC # FLD AUTO: 13.45 K/UL — HIGH (ref 3.8–10.5)
WBC # FLD AUTO: 13.45 K/UL — HIGH (ref 3.8–10.5)

## 2023-12-16 PROCEDURE — 99221 1ST HOSP IP/OBS SF/LOW 40: CPT

## 2023-12-16 PROCEDURE — 74019 RADEX ABDOMEN 2 VIEWS: CPT | Mod: 26

## 2023-12-16 PROCEDURE — 99233 SBSQ HOSP IP/OBS HIGH 50: CPT | Mod: GC

## 2023-12-16 RX ORDER — MAGNESIUM SULFATE 500 MG/ML
2 VIAL (ML) INJECTION ONCE
Refills: 0 | Status: COMPLETED | OUTPATIENT
Start: 2023-12-16 | End: 2023-12-16

## 2023-12-16 RX ORDER — SODIUM CHLORIDE 9 MG/ML
1000 INJECTION INTRAMUSCULAR; INTRAVENOUS; SUBCUTANEOUS
Refills: 0 | Status: DISCONTINUED | OUTPATIENT
Start: 2023-12-16 | End: 2023-12-16

## 2023-12-16 RX ORDER — ACETAMINOPHEN 500 MG
650 TABLET ORAL EVERY 6 HOURS
Refills: 0 | Status: DISCONTINUED | OUTPATIENT
Start: 2023-12-16 | End: 2023-12-17

## 2023-12-16 RX ORDER — ACETAMINOPHEN 500 MG
650 TABLET ORAL ONCE
Refills: 0 | Status: COMPLETED | OUTPATIENT
Start: 2023-12-16 | End: 2023-12-16

## 2023-12-16 RX ORDER — SODIUM CHLORIDE 9 MG/ML
1000 INJECTION INTRAMUSCULAR; INTRAVENOUS; SUBCUTANEOUS
Refills: 0 | Status: DISCONTINUED | OUTPATIENT
Start: 2023-12-16 | End: 2023-12-18

## 2023-12-16 RX ADMIN — SODIUM CHLORIDE 100 MILLILITER(S): 9 INJECTION INTRAMUSCULAR; INTRAVENOUS; SUBCUTANEOUS at 02:53

## 2023-12-16 RX ADMIN — MORPHINE SULFATE 4 MILLIGRAM(S): 50 CAPSULE, EXTENDED RELEASE ORAL at 22:48

## 2023-12-16 RX ADMIN — Medication 2 MILLIGRAM(S): at 17:10

## 2023-12-16 RX ADMIN — MORPHINE SULFATE 4 MILLIGRAM(S): 50 CAPSULE, EXTENDED RELEASE ORAL at 16:33

## 2023-12-16 RX ADMIN — MORPHINE SULFATE 4 MILLIGRAM(S): 50 CAPSULE, EXTENDED RELEASE ORAL at 23:30

## 2023-12-16 RX ADMIN — Medication 650 MILLIGRAM(S): at 18:56

## 2023-12-16 RX ADMIN — Medication 85 MILLIMOLE(S): at 09:46

## 2023-12-16 RX ADMIN — Medication 2 MILLIGRAM(S): at 19:58

## 2023-12-16 RX ADMIN — Medication 25 GRAM(S): at 07:59

## 2023-12-16 RX ADMIN — Medication 650 MILLIGRAM(S): at 17:56

## 2023-12-16 RX ADMIN — MORPHINE SULFATE 4 MILLIGRAM(S): 50 CAPSULE, EXTENDED RELEASE ORAL at 09:46

## 2023-12-16 RX ADMIN — Medication 650 MILLIGRAM(S): at 01:13

## 2023-12-16 RX ADMIN — Medication 650 MILLIGRAM(S): at 01:43

## 2023-12-16 RX ADMIN — Medication 2 MILLIGRAM(S): at 11:49

## 2023-12-16 RX ADMIN — MORPHINE SULFATE 4 MILLIGRAM(S): 50 CAPSULE, EXTENDED RELEASE ORAL at 04:50

## 2023-12-16 RX ADMIN — MORPHINE SULFATE 4 MILLIGRAM(S): 50 CAPSULE, EXTENDED RELEASE ORAL at 10:46

## 2023-12-16 RX ADMIN — MORPHINE SULFATE 4 MILLIGRAM(S): 50 CAPSULE, EXTENDED RELEASE ORAL at 15:33

## 2023-12-16 RX ADMIN — SODIUM CHLORIDE 100 MILLILITER(S): 9 INJECTION INTRAMUSCULAR; INTRAVENOUS; SUBCUTANEOUS at 22:18

## 2023-12-16 RX ADMIN — MORPHINE SULFATE 4 MILLIGRAM(S): 50 CAPSULE, EXTENDED RELEASE ORAL at 04:35

## 2023-12-16 NOTE — PROGRESS NOTE ADULT - ATTENDING COMMENTS
Patient was seen and examined at bedside on 12/16/2023 at 1030 am. Patient reports feeling overall unchanged. Has not passed gas or had a BM for 2 -3 days. Denies SOB, CP, N/V. ROS is otherwise negative. Vitals, labwork and pertinent imaging reviewed. Exam - NAD, AAO x 4, PERRLA, EOMI, MMM, supple neck, chest - CTA b/l,, CV - rrr, s1s2, no m/r/g, abd - soft, distended, TTP in epigastric region, + BS, ext - wwp, psych - normal affect    Plan:  -C/w IVF  -CLD if pt tolerates  -Pain control  -Surgery consult given necrosis and no flatus   -Replete electrolytes  -CIWA

## 2023-12-16 NOTE — PROGRESS NOTE ADULT - PROBLEM SELECTOR PLAN 6
PMHx HLD, follows regularly w PCP, takes rosuvastatin at home  - follow up formal med rec in am  - hold rosuvastatin until cmp results, otherwise resume home medications pending dose confirmation pt endorses long hx of alcohol consumption, currently repots consuming moderate amounts of wine multiple days per week. quit drinking x1 year but resumed etoh consumption during the pandemic. ed danette <3, ciwa score 0  pt offered referrals to counseling services, as he acknowledges his etoh consumption is problematic, plans to stop drinking & states he feels he is equipped to do so on his own, as he has done in the past. offered counseling resources/referrals but pt is not currently interested. encouraged to reach out for services as desired  ketonuria likely from acute alcohol consumption versus prolonged starvation  - consider thiamine, b12, folate supplementation  - pt's reported last drink was Tuesday, which puts him out of window of acute withdrawal, denies any hx of wd sx.    PLAN:  -ROSALIND protocol

## 2023-12-16 NOTE — CONSULT NOTE ADULT - ASSESSMENT
Assessment;  47yo M PMHx HTN, HLD, anxiety, hypothyroidism s/p total thyroidectomy for thyroid cancer (2015), ETOH abuse, multiple prior hospitalizations for EtOH pancreatitis in the past (on approx 2 additional occasions), who presents with epigastric pain x2 days, found to have a recurrent episode of alcoholic pancreatitis. General surgery consulted for further evaluation of necrotic collection noted on CT. Currently, patient reports feeling progressively distended and reports not passing flatus or having BMs since Thursday. Patient has been taking morphine RTC since admission, contributing as well. Dx c/w acute pancreatitis with 3.5cm area of early necrotic change vs focal edema, will likely a component of ileus secondary to pancreatitis and opioid use.     Recommendations:  - No acute surgical intervention needed at this time  - Recommend keeping fluids at 120cc/hr  - Advance diet as tolerated  - RUQ US to r/o gallstones  - Strict I/Os - please ensure nurse documents accurately  Team 4c will continue to follow, please call with any questions or concerns  Plan discussed with chief resident and attending, Dr. Knott.

## 2023-12-16 NOTE — CONSULT NOTE ADULT - SUBJECTIVE AND OBJECTIVE BOX
GENERAL SURGERY - CONSULT NOTE    HPI:  48-year-old male with history of EtOH pancreatitis in the past, as well as PMHx hypothyroidism, HTN, HLD, & anxiety, who presents with epigastric pain x2 days. Per pt, he has been diagnosed with alcoholic pancreatitis twice in the past, with his initial diagnosis requiring hospitalization x10 days (on subsequent occasion pt went home from ED). He explains that he drinks "a lot" whenever he does drink, around 3-4 glasses of wine, but that he does not drink daily. Following his first bout of pancreatitis, states he quit drinking for 1 year but began consuming etoh again during covid, last drink several days ago.  He explains that his current symptoms began 2 days ago after eating a meal, and that he felt so uncomfortably that he forced himself to vomit in an effort to alleviate his nausea. Other than an additional episode of induced emesis today, he has not have any other episodes of vomiting. He reports last BM the morning of sx onset that was normal in color, but none since then & denies passing gas. Pt also states that he has had decreased urination with darkened urine that has been improving since his ED arrival. When he presented to the ED he states that he felt pain and fullness in his stomach even after 1 sip of water.  In the ED he was tachycardic, found to have TWI in II, III, aVF, V4-6 (consistent w past EKG in 2020). CT consistent w acute pancreatitis, w possible small early necrotic change at the junction, as well as hepatic steatosis, no radioopaque gallstones, no bowel obstruction, CXR w cardiomegaly.  Labs significant for elevated WBC, hyponatremia 129, cl 93, bicarb 21, protein 9.4, and lipase >375, UA w small bilirubin, ketones 40, elevated protein and trace blood, danette <3   (15 Dec 2023 19:05)      GENERAL SURGERY ADDENDUM:  49yo M PMHx HTN, HLD, anxiety, hypothyroidism s/p total thyroidectomy for thyroid cancer (2015), ETOH abuse, multiple prior hospitalizations for EtOH pancreatitis in the past (on approx 2 additional occasions), who presents with epigastric pain x2 days, found to have a recurrent episode of alcoholic pancreatitis. General surgery consulted for further evaluation of necrotic collection noted on CT.     Patient reported that he drinks approx 4-5 drinks a day about 3-4x a week. Patient has had prior hospitalization for alcoholic pancreatitis (8/29/2018-9/8/2018), and has presented with additional recurrence on 1/2020. Patient reports that he had approximately 5 drinks on Tuesday night. On Thursday AM, he began to feel nauseous, experienced dull epigastric abdominal pain, and increasing abdominal distention along with frequent belching. He vomited (bilious) a few times since then and present to hospital on Friday 12/15.     Currently, patient reports feeling progressively distended and reports not passing flatus or having BMs since Thursday. Patient has been taking morphine RTC since admission, contributing as well. Patient is tachycardic to low 100s, remainder wnl. On exam, soft, NT (exam limited 2/2 recent morphine), Moderately-severly distended. Labs notable for WBC 13.45 (14), Hgb 14.8 (15.2), Na 134 (133), Cr 0.91 (0.83), Tbili 2.2 (1.4), lipase >300s on admission. Normal LFTs otherwise. CT from admission noting enlarged pancreas with effacement of the pancreatic contour. 3.5 cm area of ill-defined decreased enhancement at the junction of body and tail of the pancreas which may represent small early necrotic change versus focal edema. Moderate infiltration of the fat surrounding the pancreas, worst around the pancreatic head with small dispersed fluid. No drainable fluid collection. No pancreatic duct dilatation. AXR from this AM noting nonspecific bowel gas pattern. No evidence of obstruction. Lucency noted beneath the right hemidiaphragm on upright view which may reflect interposed bowel    PMH: HTN, HLD, Hypothyroidism, ETOH abuse, prior hospitalizations for ETOH pancreatitis  PSHx: s/p total thyroidectomy for thyroid cancer  Medications: synthroid, losartan, rosuvastatin, diazepam, propranolol  Allergies: NKDA    enoxaparin Injectable 40 milliGRAM(s) SubCutaneous every 24 hours  influenza   Vaccine 0.5 milliLiter(s) IntraMuscular once  levothyroxine 150 MICROGram(s) Oral daily  LORazepam     Tablet 2 milliGRAM(s) Oral every 2 hours PRN  melatonin 3 milliGRAM(s) Oral at bedtime PRN  morphine  - Injectable 4 milliGRAM(s) IV Push every 4 hours PRN  sodium chloride 0.9%. 1000 milliLiter(s) IV Continuous <Continuous>      Allergies    No Known Allergies    Intolerances        ICU Vital Signs Last 24 Hrs  T(F): 97.9 (12-16-23 @ 09:48), Max: 99.9 (12-16-23 @ 06:31)  HR: 110 (12-16-23 @ 09:48) (96 - 121)  BP: 136/83 (12-16-23 @ 09:48) (134/97 - 140/90)  BP(mean): 101 (12-16-23 @ 09:48) (101 - 101)  ABP: --  RR: 18 (12-16-23 @ 09:48) (18 - 18)  SpO2: 93% (12-16-23 @ 09:48) (93% - 95%)    General: AAOx3, NAD, WDWN, laying comfortably in bed  Cardio: S1,S2, No MRG, RRR  Pulm: Lungs bilaterally clear to auscultation  Abdomen:  soft, NT (exam limited 2/2 recent morphine), Moderately-severely distended.   Extremities: WWP, peripheral pulses appreciated    LABS:    12-16    134<L>  |  97  |  13  ----------------------------<  138<H>  3.8   |  24  |  0.91    Ca    8.2<L>      16 Dec 2023 05:30  Phos  1.8     12-16  Mg     1.7     12-16    TPro  7.4  /  Alb  4.0  /  TBili  2.2<H>  /  DBili  x   /  AST  25  /  ALT  17  /  AlkPhos  64  12-16  LIVER FUNCTIONS - ( 16 Dec 2023 05:30 )  Alb: 4.0 g/dL / Pro: 7.4 g/dL / ALK PHOS: 64 U/L / ALT: 17 U/L / AST: 25 U/L / GGT: x                               14.8   13.45 )-----------( 237      ( 16 Dec 2023 05:30 )             45.1   CARDIAC MARKERS ( 15 Dec 2023 08:43 )  x     / x     / 103 U/L / x     / <0.5 ng/mL    Urinalysis Basic - ( 16 Dec 2023 05:30 )    Color: x / Appearance: x / SG: x / pH: x  Gluc: 138 mg/dL / Ketone: x  / Bili: x / Urobili: x   Blood: x / Protein: x / Nitrite: x   Leuk Esterase: x / RBC: x / WBC x   Sq Epi: x / Non Sq Epi: x / Bacteria: x    CAPILLARY BLOOD GLUCOSE     GENERAL SURGERY - CONSULT NOTE    HPI:  48-year-old male with history of EtOH pancreatitis in the past, as well as PMHx hypothyroidism, HTN, HLD, & anxiety, who presents with epigastric pain x2 days. Per pt, he has been diagnosed with alcoholic pancreatitis twice in the past, with his initial diagnosis requiring hospitalization x10 days (on subsequent occasion pt went home from ED). He explains that he drinks "a lot" whenever he does drink, around 3-4 glasses of wine, but that he does not drink daily. Following his first bout of pancreatitis, states he quit drinking for 1 year but began consuming etoh again during covid, last drink several days ago.  He explains that his current symptoms began 2 days ago after eating a meal, and that he felt so uncomfortably that he forced himself to vomit in an effort to alleviate his nausea. Other than an additional episode of induced emesis today, he has not have any other episodes of vomiting. He reports last BM the morning of sx onset that was normal in color, but none since then & denies passing gas. Pt also states that he has had decreased urination with darkened urine that has been improving since his ED arrival. When he presented to the ED he states that he felt pain and fullness in his stomach even after 1 sip of water.  In the ED he was tachycardic, found to have TWI in II, III, aVF, V4-6 (consistent w past EKG in 2020). CT consistent w acute pancreatitis, w possible small early necrotic change at the junction, as well as hepatic steatosis, no radioopaque gallstones, no bowel obstruction, CXR w cardiomegaly.  Labs significant for elevated WBC, hyponatremia 129, cl 93, bicarb 21, protein 9.4, and lipase >375, UA w small bilirubin, ketones 40, elevated protein and trace blood, danette <3   (15 Dec 2023 19:05)      GENERAL SURGERY ADDENDUM:  47yo M PMHx HTN, HLD, anxiety, hypothyroidism s/p total thyroidectomy for thyroid cancer (2015), ETOH abuse, multiple prior hospitalizations for EtOH pancreatitis in the past (on approx 2 additional occasions), who presents with epigastric pain x2 days, found to have a recurrent episode of alcoholic pancreatitis. General surgery consulted for further evaluation of necrotic collection noted on CT.     Patient reported that he drinks approx 4-5 drinks a day about 3-4x a week. Patient has had prior hospitalization for alcoholic pancreatitis (8/29/2018-9/8/2018), and has presented with additional recurrence on 1/2020. Patient reports that he had approximately 5 drinks on Tuesday night. On Thursday AM, he began to feel nauseous, experienced dull epigastric abdominal pain, and increasing abdominal distention along with frequent belching. He vomited (bilious) a few times since then and present to hospital on Friday 12/15.     Currently, patient reports feeling progressively distended and reports not passing flatus or having BMs since Thursday. Patient has been taking morphine RTC since admission, contributing as well. Patient is tachycardic to low 100s, remainder wnl. On exam, soft, NT (exam limited 2/2 recent morphine), Moderately-severly distended. Labs notable for WBC 13.45 (14), Hgb 14.8 (15.2), Na 134 (133), Cr 0.91 (0.83), Tbili 2.2 (1.4), lipase >300s on admission. Normal LFTs otherwise. CT from admission noting enlarged pancreas with effacement of the pancreatic contour. 3.5 cm area of ill-defined decreased enhancement at the junction of body and tail of the pancreas which may represent small early necrotic change versus focal edema. Moderate infiltration of the fat surrounding the pancreas, worst around the pancreatic head with small dispersed fluid. No drainable fluid collection. No pancreatic duct dilatation. AXR from this AM noting nonspecific bowel gas pattern. No evidence of obstruction. Lucency noted beneath the right hemidiaphragm on upright view which may reflect interposed bowel    PMH: HTN, HLD, Hypothyroidism, ETOH abuse, prior hospitalizations for ETOH pancreatitis  PSHx: s/p total thyroidectomy for thyroid cancer  Medications: synthroid, losartan, rosuvastatin, diazepam, propranolol  Allergies: NKDA    enoxaparin Injectable 40 milliGRAM(s) SubCutaneous every 24 hours  influenza   Vaccine 0.5 milliLiter(s) IntraMuscular once  levothyroxine 150 MICROGram(s) Oral daily  LORazepam     Tablet 2 milliGRAM(s) Oral every 2 hours PRN  melatonin 3 milliGRAM(s) Oral at bedtime PRN  morphine  - Injectable 4 milliGRAM(s) IV Push every 4 hours PRN  sodium chloride 0.9%. 1000 milliLiter(s) IV Continuous <Continuous>      Allergies    No Known Allergies    Intolerances        ICU Vital Signs Last 24 Hrs  T(F): 97.9 (12-16-23 @ 09:48), Max: 99.9 (12-16-23 @ 06:31)  HR: 110 (12-16-23 @ 09:48) (96 - 121)  BP: 136/83 (12-16-23 @ 09:48) (134/97 - 140/90)  BP(mean): 101 (12-16-23 @ 09:48) (101 - 101)  ABP: --  RR: 18 (12-16-23 @ 09:48) (18 - 18)  SpO2: 93% (12-16-23 @ 09:48) (93% - 95%)    General: AAOx3, NAD, WDWN, laying comfortably in bed  Cardio: S1,S2, No MRG, RRR  Pulm: Lungs bilaterally clear to auscultation  Abdomen:  soft, NT (exam limited 2/2 recent morphine), Moderately-severely distended.   Extremities: WWP, peripheral pulses appreciated    LABS:    12-16    134<L>  |  97  |  13  ----------------------------<  138<H>  3.8   |  24  |  0.91    Ca    8.2<L>      16 Dec 2023 05:30  Phos  1.8     12-16  Mg     1.7     12-16    TPro  7.4  /  Alb  4.0  /  TBili  2.2<H>  /  DBili  x   /  AST  25  /  ALT  17  /  AlkPhos  64  12-16  LIVER FUNCTIONS - ( 16 Dec 2023 05:30 )  Alb: 4.0 g/dL / Pro: 7.4 g/dL / ALK PHOS: 64 U/L / ALT: 17 U/L / AST: 25 U/L / GGT: x                               14.8   13.45 )-----------( 237      ( 16 Dec 2023 05:30 )             45.1   CARDIAC MARKERS ( 15 Dec 2023 08:43 )  x     / x     / 103 U/L / x     / <0.5 ng/mL    Urinalysis Basic - ( 16 Dec 2023 05:30 )    Color: x / Appearance: x / SG: x / pH: x  Gluc: 138 mg/dL / Ketone: x  / Bili: x / Urobili: x   Blood: x / Protein: x / Nitrite: x   Leuk Esterase: x / RBC: x / WBC x   Sq Epi: x / Non Sq Epi: x / Bacteria: x    CAPILLARY BLOOD GLUCOSE

## 2023-12-16 NOTE — SBIRT NOTE ADULT - NSSBIRTUNABLESCROTHER_GEN_A_CORE
Patient declined to complete SBIRT at this time. Patient reported he is going to quit drinking cold turkey as he had done in the past. Patient declined wanting any resources or referrals to treatment at this time.

## 2023-12-16 NOTE — PROGRESS NOTE ADULT - PROBLEM SELECTOR PLAN 3
pt endorses long hx of alcohol consumption, currently repots consuming moderate amounts of wine multiple days per week. quit drinking x1 year but resumed etoh consumption during the pandemic. ed danette <3, ciwa score 0  pt offered referrals to counseling services, as he acknowledges his etoh consumption is problematic, plans to stop drinking & states he feels he is equipped to do so on his own, as he has done in the past. offered counseling resources/referrals but pt is not currently interested. encouraged to reach out for services as desired  ketonuria likely from acute alcohol consumption versus prolonged starvation  - consider thiamine, b12, folate supplementation  - pt's reported last drink was Tuesday, which puts him out of window of acute withdrawal, denies any hx of wd sx.    PLAN:  -ROSALIND protocol Likely a complication of acute pancreatitis.  CT A/P reveal Possible small early necrotic change at the junction of body and tail.  8.5>7.7>8.2    -Continue to monitor Ca+2

## 2023-12-16 NOTE — PROGRESS NOTE ADULT - PROBLEM SELECTOR PLAN 5
PMHx HTN, follows regularly w PCP (Dr. La?), home medications include propanolol 20mg qhs, losartan (unsure of dose)  - follow up formal med rec in am  - resume home medications pending dose confirmation Likely 2/2 Alcohol use.  -CT A/P: hepatosteatosis  -FIB-4: 1.25, therefore Low fibrosis score and is not appropriate candidate that requires liver biopsy    PLAN:  - continue to monitor AST/ALTs  - offer alcohol use cessation counseling (see below)

## 2023-12-16 NOTE — PROGRESS NOTE ADULT - PROBLEM SELECTOR PLAN 4
PMHx hyperthyroidism, s/p total thyroidectomy for malignant nodule, takes synthroid 150mcg every morning except sundays, on which days pt reports he takes 300mcg  - TSH: 3.020, wnl    PLAN:  - continue synthroid 150mcg qd Likely Reactive 2/2 given acute pancreatitis.  Now downtrending, 16.12>14.03>13.45    -Continue to monitor CBC

## 2023-12-16 NOTE — PROGRESS NOTE ADULT - PROBLEM SELECTOR PLAN 2
Likely 2/2 Alcohol use.  -CT A/P: hepatosteatosis  -FIB-4: 1.25, therefore Low fibrosis score and is not appropriate candidate that requires liver biopsy    PLAN:  - continue to monitor AST/ALTs  - offer alcohol use cessation counseling (see below) 129>133>134  Likely etiology is SIADH secondary to pain.    -f/u Urine lytes  -Continue to monitor CBC

## 2023-12-16 NOTE — PROGRESS NOTE ADULT - PROBLEM SELECTOR PLAN 1
Pt p/w epigastric pain radiating to back, attributes to past episodes of pancreatitis, Lipase >375. Likely 2/2 chronic etoh consumption (reports 3-4 glasses of wine multiple days/week), states he had stopped drinking for 1 year following initial dx w pancreatitis (for which pt was hospitalized at [facility] for 10 days), but began to drink again during the pandemic.   -[place]'s criteria: 0  -CT A/P: Acute pancreatitis. Possible small early necrotic change at the junction of body and tail.    PLAN:  - Continue 1L NS 100cc/hr x10 hrs  - Pain regimen:     ~Tylenol 650mg Q6     ~IV Morphine 4mg Q4 PRN  - General surgery consulted re: necrotic changes and decreased bowel motility, appreciate recs  - monitor Hct (goal 35-44%)  -monitor BUN (increase IVF if BUN remains the same or increases)  - clear liquid diet, advance as tolerated Pt p/w epigastric pain radiating to back, attributes to past episodes of pancreatitis, Lipase >375. Likely 2/2 chronic etoh consumption (reports 3-4 glasses of wine multiple days/week), states he had stopped drinking for 1 year following initial dx w pancreatitis (for which pt was hospitalized at [facility] for 10 days), but began to drink again during the pandemic.   -[place]'s criteria: 0  -CT A/P: Acute pancreatitis. Possible small early necrotic change at the junction of body and tail.    PLAN:  - Continue 1L NS 100cc/hr x10 hrs  - Pain regimen:     ~Tylenol 650mg Q6     ~IV Morphine 4mg Q4 PRN  - General surgery consulted re: necrotic changes and decreased bowel motility, appreciate recs  - monitor Hct (goal 35-44%)  -monitor BUN (increase IVF if BUN remains the same or increases)  - clear liquid diet, advance as tolerated -Pt p/w epigastric pain radiating to back, attributes to past episodes of pancreatitis, Lipase >375.   -Likely 2/2 chronic etoh consumption (reports 4 glasses of wine 5 days/week) and hypertriglyceridemia given Triglycerides= 1660  -[place]'s criteria: 0  -CT A/P: Acute pancreatitis. Possible small early necrotic change at the junction of body and tail.    PLAN:  - Continue 1L NS 100cc/hr x10 hrs  - Pain regimen:     ~Tylenol 650mg Q6     ~IV Morphine 4mg Q4 PRN  - General surgery consulted re: necrotic changes and decreased bowel motility, appreciate recs  - monitor Hct (goal 35-44%)  -monitor BUN (increase IVF if BUN remains the same or increases)  - clear liquid diet, advance as tolerated -Pt p/w epigastric pain radiating to back, attributes to past episodes of pancreatitis, Lipase >375.   -Likely 2/2 chronic etoh consumption (reports 4 glasses of wine 5 days/week) and hypertriglyceridemia given Triglycerides= 1660  -[place]'s criteria: 0  -CT A/P: Acute pancreatitis. Possible small early necrotic change at the junction of body and tail.    PLAN:  - Continue 1L NS 100cc/hr x10 hrs  - Pain regimen:     ~Tylenol 650mg Q6     ~IV Morphine 4mg Q4 PRN  - General surgery consulted re: necrotic changes and decreased bowel motility, appreciate recs  - monitor Hct (goal 35-44%)  -monitor BUN (increase IVF if BUN remains the same or increases)  - clear liquid diet, advance as tolerated

## 2023-12-16 NOTE — PROGRESS NOTE ADULT - PROBLEM SELECTOR PLAN 7
Pt prescribed valium, of which he reports taking 2.5mg on rare occasions when unable to sleep  - prn melatonin for insomnia while inpatient PMHx hyperthyroidism, s/p total thyroidectomy for malignant nodule, takes synthroid 150mcg every morning except sundays, on which days pt reports he takes 300mcg  - TSH: 3.020, wnl    PLAN:  - continue synthroid 150mcg qd

## 2023-12-16 NOTE — PROGRESS NOTE ADULT - SUBJECTIVE AND OBJECTIVE BOX
Patient is a 48y old  Male who presents with a chief complaint of pancreatitis (16 Dec 2023 17:01)      INTERVAL HPI/OVERNIGHT EVENTS: Pt c/o morphine makes him nauseous, tylenol po x1 ordered. Pt does not want IV fluids running, was stopped @ 12:10 am per RN, started again at 100 cc/hr. Morphine given at 4:30 am.     SUBJECTIVE: Patient seen and examined at bedside. Sitting in chair at bedside. Patient reports that he has severe abdominal pain that radiates to his back. It is unchanged from prior. He expresses concern with taking too many/too strong pain medications and would like to try to space them out as much as possible. He belches, but has not passed flatulence or had a bowel movement since Thursday. His last drink was on Tuesday. He reports drinking 4-5 glasses of wine at least 5 nights/week. No vomiting episodes except for 1 self-induced episode of vomiting during initial admission. Otherwise, he denies N/V/D, fever, fatigue, chills, SOB, CP, or other pertinent Sx on ROS.    Vital Signs Last 24 Hrs  T(C): 36.7 (16 Dec 2023 15:33), Max: 37.7 (16 Dec 2023 06:31)  T(F): 98.1 (16 Dec 2023 15:33), Max: 99.9 (16 Dec 2023 06:31)  HR: 115 (16 Dec 2023 15:50) (96 - 124)  BP: 148/85 (16 Dec 2023 15:33) (136/83 - 148/85)  BP(mean): 101 (16 Dec 2023 09:48) (101 - 101)  RR: 19 (16 Dec 2023 15:33) (18 - 19)  SpO2: 93% (16 Dec 2023 15:33) (93% - 95%)    Parameters below as of 16 Dec 2023 15:33  Patient On (Oxygen Delivery Method): room air      PHYSICAL EXAM:  GENERAL: NAD, well-groomed, well-developed  HEAD:  Atraumatic, Normocephalic  EYES: Conjunctiva and sclera clear  ENMT: Dry mucous membranes, Good dentition.  NECK: Supple  NERVOUS SYSTEM:  Alert & Oriented X3, Good concentration  CHEST/LUNG: Clear to percussion bilaterally; No rales, rhonchi, wheezing, or rubs  HEART: Regular rate and rhythm; No murmurs, rubs, or gallops  ABDOMEN: Soft, +Distended abdomen, +TTP in Epigastrium radiating to back; Hypoactive Bowel sounds present  EXTREMITIES:  2+ Peripheral Pulses, No clubbing, cyanosis, or edema  SKIN: No rashes or lesions    Consultant(s) Notes Reviewed:  [x ] YES  [ ] NO  Care Discussed with Consultants/Other Providers [ x] YES  [ ] NO    LABS:                          14.8   13.45 )-----------( 237      ( 16 Dec 2023 05:30 )             45.1     12-16    134<L>  |  97  |  13  ----------------------------<  138<H>  3.8   |  24  |  0.91    Ca    8.2<L>      16 Dec 2023 05:30  Phos  1.8     12-16  Mg     1.7     12-16    TPro  7.4  /  Alb  4.0  /  TBili  2.2<H>  /  DBili  x   /  AST  25  /  ALT  17  /  AlkPhos  64  12-16      RADIOLOGY & ADDITIONAL TESTS:    Imaging Personally Reviewed:  [ ] YES  [ ] NO  acetaminophen     Tablet .. 650 milliGRAM(s) Oral every 6 hours PRN  enoxaparin Injectable 40 milliGRAM(s) SubCutaneous every 24 hours  influenza   Vaccine 0.5 milliLiter(s) IntraMuscular once  levothyroxine 150 MICROGram(s) Oral daily  LORazepam     Tablet 2 milliGRAM(s) Oral every 2 hours PRN  melatonin 3 milliGRAM(s) Oral at bedtime PRN  morphine  - Injectable 4 milliGRAM(s) IV Push every 4 hours PRN  sodium chloride 0.9%. 1000 milliLiter(s) IV Continuous <Continuous>      HEALTH ISSUES - PROBLEM Dx:  Acute pancreatitis    HTN (hypertension)    HLD (hyperlipidemia)    Anxiety    Hypothyroidism    Dysfunctional alcohol use    Fatty liver

## 2023-12-16 NOTE — PROGRESS NOTE ADULT - PROBLEM SELECTOR PLAN 9
PMHx HLD, follows regularly w PCP, takes rosuvastatin at home  - follow up formal med rec in am  - hold rosuvastatin until cmp results, otherwise resume home medications pending dose confirmation

## 2023-12-17 DIAGNOSIS — R65.10 SYSTEMIC INFLAMMATORY RESPONSE SYNDROME (SIRS) OF NON-INFECTIOUS ORIGIN WITHOUT ACUTE ORGAN DYSFUNCTION: ICD-10-CM

## 2023-12-17 LAB
ALBUMIN SERPL ELPH-MCNC: 3.4 G/DL — SIGNIFICANT CHANGE UP (ref 3.3–5)
ALBUMIN SERPL ELPH-MCNC: 3.4 G/DL — SIGNIFICANT CHANGE UP (ref 3.3–5)
ALP SERPL-CCNC: 60 U/L — SIGNIFICANT CHANGE UP (ref 40–120)
ALP SERPL-CCNC: 60 U/L — SIGNIFICANT CHANGE UP (ref 40–120)
ALT FLD-CCNC: 28 U/L — SIGNIFICANT CHANGE UP (ref 10–45)
ALT FLD-CCNC: 28 U/L — SIGNIFICANT CHANGE UP (ref 10–45)
ANION GAP SERPL CALC-SCNC: 11 MMOL/L — SIGNIFICANT CHANGE UP (ref 5–17)
ANION GAP SERPL CALC-SCNC: 11 MMOL/L — SIGNIFICANT CHANGE UP (ref 5–17)
ANION GAP SERPL CALC-SCNC: 12 MMOL/L — SIGNIFICANT CHANGE UP (ref 5–17)
ANION GAP SERPL CALC-SCNC: 12 MMOL/L — SIGNIFICANT CHANGE UP (ref 5–17)
AST SERPL-CCNC: 42 U/L — HIGH (ref 10–40)
AST SERPL-CCNC: 42 U/L — HIGH (ref 10–40)
BASOPHILS # BLD AUTO: 0.03 K/UL — SIGNIFICANT CHANGE UP (ref 0–0.2)
BASOPHILS # BLD AUTO: 0.03 K/UL — SIGNIFICANT CHANGE UP (ref 0–0.2)
BASOPHILS NFR BLD AUTO: 0.3 % — SIGNIFICANT CHANGE UP (ref 0–2)
BASOPHILS NFR BLD AUTO: 0.3 % — SIGNIFICANT CHANGE UP (ref 0–2)
BILIRUB SERPL-MCNC: 2.8 MG/DL — HIGH (ref 0.2–1.2)
BILIRUB SERPL-MCNC: 2.8 MG/DL — HIGH (ref 0.2–1.2)
BUN SERPL-MCNC: 10 MG/DL — SIGNIFICANT CHANGE UP (ref 7–23)
BUN SERPL-MCNC: 10 MG/DL — SIGNIFICANT CHANGE UP (ref 7–23)
BUN SERPL-MCNC: 7 MG/DL — SIGNIFICANT CHANGE UP (ref 7–23)
BUN SERPL-MCNC: 7 MG/DL — SIGNIFICANT CHANGE UP (ref 7–23)
CALCIUM SERPL-MCNC: 8.1 MG/DL — LOW (ref 8.4–10.5)
CALCIUM SERPL-MCNC: 8.1 MG/DL — LOW (ref 8.4–10.5)
CALCIUM SERPL-MCNC: 8.5 MG/DL — SIGNIFICANT CHANGE UP (ref 8.4–10.5)
CALCIUM SERPL-MCNC: 8.5 MG/DL — SIGNIFICANT CHANGE UP (ref 8.4–10.5)
CHLORIDE SERPL-SCNC: 101 MMOL/L — SIGNIFICANT CHANGE UP (ref 96–108)
CHLORIDE SERPL-SCNC: 101 MMOL/L — SIGNIFICANT CHANGE UP (ref 96–108)
CHLORIDE SERPL-SCNC: 99 MMOL/L — SIGNIFICANT CHANGE UP (ref 96–108)
CHLORIDE SERPL-SCNC: 99 MMOL/L — SIGNIFICANT CHANGE UP (ref 96–108)
CO2 SERPL-SCNC: 24 MMOL/L — SIGNIFICANT CHANGE UP (ref 22–31)
CO2 SERPL-SCNC: 24 MMOL/L — SIGNIFICANT CHANGE UP (ref 22–31)
CO2 SERPL-SCNC: 26 MMOL/L — SIGNIFICANT CHANGE UP (ref 22–31)
CO2 SERPL-SCNC: 26 MMOL/L — SIGNIFICANT CHANGE UP (ref 22–31)
CREAT SERPL-MCNC: 0.84 MG/DL — SIGNIFICANT CHANGE UP (ref 0.5–1.3)
CREAT SERPL-MCNC: 0.84 MG/DL — SIGNIFICANT CHANGE UP (ref 0.5–1.3)
CREAT SERPL-MCNC: 0.95 MG/DL — SIGNIFICANT CHANGE UP (ref 0.5–1.3)
CREAT SERPL-MCNC: 0.95 MG/DL — SIGNIFICANT CHANGE UP (ref 0.5–1.3)
EGFR: 107 ML/MIN/1.73M2 — SIGNIFICANT CHANGE UP
EGFR: 107 ML/MIN/1.73M2 — SIGNIFICANT CHANGE UP
EGFR: 98 ML/MIN/1.73M2 — SIGNIFICANT CHANGE UP
EGFR: 98 ML/MIN/1.73M2 — SIGNIFICANT CHANGE UP
EOSINOPHIL # BLD AUTO: 0.1 K/UL — SIGNIFICANT CHANGE UP (ref 0–0.5)
EOSINOPHIL # BLD AUTO: 0.1 K/UL — SIGNIFICANT CHANGE UP (ref 0–0.5)
EOSINOPHIL NFR BLD AUTO: 0.8 % — SIGNIFICANT CHANGE UP (ref 0–6)
EOSINOPHIL NFR BLD AUTO: 0.8 % — SIGNIFICANT CHANGE UP (ref 0–6)
GLUCOSE SERPL-MCNC: 129 MG/DL — HIGH (ref 70–99)
GLUCOSE SERPL-MCNC: 129 MG/DL — HIGH (ref 70–99)
GLUCOSE SERPL-MCNC: 139 MG/DL — HIGH (ref 70–99)
GLUCOSE SERPL-MCNC: 139 MG/DL — HIGH (ref 70–99)
HCT VFR BLD CALC: 39.5 % — SIGNIFICANT CHANGE UP (ref 39–50)
HCT VFR BLD CALC: 39.5 % — SIGNIFICANT CHANGE UP (ref 39–50)
HGB BLD-MCNC: 12.5 G/DL — LOW (ref 13–17)
HGB BLD-MCNC: 12.5 G/DL — LOW (ref 13–17)
IMM GRANULOCYTES NFR BLD AUTO: 0.6 % — SIGNIFICANT CHANGE UP (ref 0–0.9)
IMM GRANULOCYTES NFR BLD AUTO: 0.6 % — SIGNIFICANT CHANGE UP (ref 0–0.9)
INR BLD: 1.09 — SIGNIFICANT CHANGE UP (ref 0.85–1.18)
INR BLD: 1.09 — SIGNIFICANT CHANGE UP (ref 0.85–1.18)
LYMPHOCYTES # BLD AUTO: 1.09 K/UL — SIGNIFICANT CHANGE UP (ref 1–3.3)
LYMPHOCYTES # BLD AUTO: 1.09 K/UL — SIGNIFICANT CHANGE UP (ref 1–3.3)
LYMPHOCYTES # BLD AUTO: 9.1 % — LOW (ref 13–44)
LYMPHOCYTES # BLD AUTO: 9.1 % — LOW (ref 13–44)
MAGNESIUM SERPL-MCNC: 2.4 MG/DL — SIGNIFICANT CHANGE UP (ref 1.6–2.6)
MAGNESIUM SERPL-MCNC: 2.4 MG/DL — SIGNIFICANT CHANGE UP (ref 1.6–2.6)
MCHC RBC-ENTMCNC: 30.6 PG — SIGNIFICANT CHANGE UP (ref 27–34)
MCHC RBC-ENTMCNC: 30.6 PG — SIGNIFICANT CHANGE UP (ref 27–34)
MCHC RBC-ENTMCNC: 31.6 GM/DL — LOW (ref 32–36)
MCHC RBC-ENTMCNC: 31.6 GM/DL — LOW (ref 32–36)
MCV RBC AUTO: 96.6 FL — SIGNIFICANT CHANGE UP (ref 80–100)
MCV RBC AUTO: 96.6 FL — SIGNIFICANT CHANGE UP (ref 80–100)
MELD SCORE WITH DIALYSIS: 25 POINTS — SIGNIFICANT CHANGE UP
MELD SCORE WITH DIALYSIS: 25 POINTS — SIGNIFICANT CHANGE UP
MELD SCORE WITHOUT DIALYSIS: 11 POINTS — SIGNIFICANT CHANGE UP
MELD SCORE WITHOUT DIALYSIS: 11 POINTS — SIGNIFICANT CHANGE UP
MONOCYTES # BLD AUTO: 1.02 K/UL — HIGH (ref 0–0.9)
MONOCYTES # BLD AUTO: 1.02 K/UL — HIGH (ref 0–0.9)
MONOCYTES NFR BLD AUTO: 8.6 % — SIGNIFICANT CHANGE UP (ref 2–14)
MONOCYTES NFR BLD AUTO: 8.6 % — SIGNIFICANT CHANGE UP (ref 2–14)
NEUTROPHILS # BLD AUTO: 9.61 K/UL — HIGH (ref 1.8–7.4)
NEUTROPHILS # BLD AUTO: 9.61 K/UL — HIGH (ref 1.8–7.4)
NEUTROPHILS NFR BLD AUTO: 80.6 % — HIGH (ref 43–77)
NEUTROPHILS NFR BLD AUTO: 80.6 % — HIGH (ref 43–77)
NRBC # BLD: 0 /100 WBCS — SIGNIFICANT CHANGE UP (ref 0–0)
NRBC # BLD: 0 /100 WBCS — SIGNIFICANT CHANGE UP (ref 0–0)
PHOSPHATE SERPL-MCNC: 1.6 MG/DL — LOW (ref 2.5–4.5)
PHOSPHATE SERPL-MCNC: 1.6 MG/DL — LOW (ref 2.5–4.5)
PLATELET # BLD AUTO: 206 K/UL — SIGNIFICANT CHANGE UP (ref 150–400)
PLATELET # BLD AUTO: 206 K/UL — SIGNIFICANT CHANGE UP (ref 150–400)
POTASSIUM SERPL-MCNC: 3.2 MMOL/L — LOW (ref 3.5–5.3)
POTASSIUM SERPL-MCNC: 3.2 MMOL/L — LOW (ref 3.5–5.3)
POTASSIUM SERPL-MCNC: 3.9 MMOL/L — SIGNIFICANT CHANGE UP (ref 3.5–5.3)
POTASSIUM SERPL-MCNC: 3.9 MMOL/L — SIGNIFICANT CHANGE UP (ref 3.5–5.3)
POTASSIUM SERPL-SCNC: 3.2 MMOL/L — LOW (ref 3.5–5.3)
POTASSIUM SERPL-SCNC: 3.2 MMOL/L — LOW (ref 3.5–5.3)
POTASSIUM SERPL-SCNC: 3.9 MMOL/L — SIGNIFICANT CHANGE UP (ref 3.5–5.3)
POTASSIUM SERPL-SCNC: 3.9 MMOL/L — SIGNIFICANT CHANGE UP (ref 3.5–5.3)
PROT SERPL-MCNC: 6.9 G/DL — SIGNIFICANT CHANGE UP (ref 6–8.3)
PROT SERPL-MCNC: 6.9 G/DL — SIGNIFICANT CHANGE UP (ref 6–8.3)
PROTHROM AB SERPL-ACNC: 12.4 SEC — SIGNIFICANT CHANGE UP (ref 9.5–13)
PROTHROM AB SERPL-ACNC: 12.4 SEC — SIGNIFICANT CHANGE UP (ref 9.5–13)
RBC # BLD: 4.09 M/UL — LOW (ref 4.2–5.8)
RBC # BLD: 4.09 M/UL — LOW (ref 4.2–5.8)
RBC # FLD: 14.2 % — SIGNIFICANT CHANGE UP (ref 10.3–14.5)
RBC # FLD: 14.2 % — SIGNIFICANT CHANGE UP (ref 10.3–14.5)
SODIUM SERPL-SCNC: 134 MMOL/L — LOW (ref 135–145)
SODIUM SERPL-SCNC: 134 MMOL/L — LOW (ref 135–145)
SODIUM SERPL-SCNC: 139 MMOL/L — SIGNIFICANT CHANGE UP (ref 135–145)
SODIUM SERPL-SCNC: 139 MMOL/L — SIGNIFICANT CHANGE UP (ref 135–145)
TRIGL SERPL-MCNC: 660 MG/DL — HIGH
TRIGL SERPL-MCNC: 660 MG/DL — HIGH
WBC # BLD: 11.92 K/UL — HIGH (ref 3.8–10.5)
WBC # BLD: 11.92 K/UL — HIGH (ref 3.8–10.5)
WBC # FLD AUTO: 11.92 K/UL — HIGH (ref 3.8–10.5)
WBC # FLD AUTO: 11.92 K/UL — HIGH (ref 3.8–10.5)

## 2023-12-17 PROCEDURE — 99233 SBSQ HOSP IP/OBS HIGH 50: CPT | Mod: GC

## 2023-12-17 PROCEDURE — 99222 1ST HOSP IP/OBS MODERATE 55: CPT

## 2023-12-17 PROCEDURE — 76705 ECHO EXAM OF ABDOMEN: CPT | Mod: 26

## 2023-12-17 PROCEDURE — 99232 SBSQ HOSP IP/OBS MODERATE 35: CPT

## 2023-12-17 RX ORDER — ACETAMINOPHEN 500 MG
650 TABLET ORAL EVERY 6 HOURS
Refills: 0 | Status: DISCONTINUED | OUTPATIENT
Start: 2023-12-17 | End: 2023-12-19

## 2023-12-17 RX ORDER — SENNA PLUS 8.6 MG/1
2 TABLET ORAL AT BEDTIME
Refills: 0 | Status: DISCONTINUED | OUTPATIENT
Start: 2023-12-17 | End: 2023-12-19

## 2023-12-17 RX ORDER — SODIUM,POTASSIUM PHOSPHATES 278-250MG
2 POWDER IN PACKET (EA) ORAL ONCE
Refills: 0 | Status: COMPLETED | OUTPATIENT
Start: 2023-12-17 | End: 2023-12-17

## 2023-12-17 RX ORDER — POTASSIUM PHOSPHATE, MONOBASIC POTASSIUM PHOSPHATE, DIBASIC 236; 224 MG/ML; MG/ML
15 INJECTION, SOLUTION INTRAVENOUS ONCE
Refills: 0 | Status: COMPLETED | OUTPATIENT
Start: 2023-12-17 | End: 2023-12-17

## 2023-12-17 RX ORDER — ATORVASTATIN CALCIUM 80 MG/1
20 TABLET, FILM COATED ORAL AT BEDTIME
Refills: 0 | Status: DISCONTINUED | OUTPATIENT
Start: 2023-12-17 | End: 2023-12-19

## 2023-12-17 RX ORDER — ACETAMINOPHEN 500 MG
1000 TABLET ORAL ONCE
Refills: 0 | Status: COMPLETED | OUTPATIENT
Start: 2023-12-17 | End: 2023-12-17

## 2023-12-17 RX ORDER — LOSARTAN POTASSIUM 100 MG/1
50 TABLET, FILM COATED ORAL
Refills: 0 | Status: DISCONTINUED | OUTPATIENT
Start: 2023-12-18 | End: 2023-12-19

## 2023-12-17 RX ORDER — SODIUM CHLORIDE 9 MG/ML
1000 INJECTION, SOLUTION INTRAVENOUS
Refills: 0 | Status: DISCONTINUED | OUTPATIENT
Start: 2023-12-17 | End: 2023-12-19

## 2023-12-17 RX ORDER — POLYETHYLENE GLYCOL 3350 17 G/17G
17 POWDER, FOR SOLUTION ORAL EVERY 12 HOURS
Refills: 0 | Status: DISCONTINUED | OUTPATIENT
Start: 2023-12-17 | End: 2023-12-19

## 2023-12-17 RX ADMIN — MORPHINE SULFATE 4 MILLIGRAM(S): 50 CAPSULE, EXTENDED RELEASE ORAL at 17:18

## 2023-12-17 RX ADMIN — POTASSIUM PHOSPHATE, MONOBASIC POTASSIUM PHOSPHATE, DIBASIC 62.5 MILLIMOLE(S): 236; 224 INJECTION, SOLUTION INTRAVENOUS at 12:15

## 2023-12-17 RX ADMIN — MORPHINE SULFATE 4 MILLIGRAM(S): 50 CAPSULE, EXTENDED RELEASE ORAL at 12:30

## 2023-12-17 RX ADMIN — ATORVASTATIN CALCIUM 20 MILLIGRAM(S): 80 TABLET, FILM COATED ORAL at 21:48

## 2023-12-17 RX ADMIN — Medication 650 MILLIGRAM(S): at 15:01

## 2023-12-17 RX ADMIN — Medication 150 MICROGRAM(S): at 06:23

## 2023-12-17 RX ADMIN — Medication 400 MILLIGRAM(S): at 21:47

## 2023-12-17 RX ADMIN — Medication 2 PACKET(S): at 08:01

## 2023-12-17 RX ADMIN — Medication 650 MILLIGRAM(S): at 02:10

## 2023-12-17 RX ADMIN — Medication 2 MILLIGRAM(S): at 00:48

## 2023-12-17 RX ADMIN — Medication 650 MILLIGRAM(S): at 16:00

## 2023-12-17 RX ADMIN — MORPHINE SULFATE 4 MILLIGRAM(S): 50 CAPSULE, EXTENDED RELEASE ORAL at 07:26

## 2023-12-17 RX ADMIN — SODIUM CHLORIDE 100 MILLILITER(S): 9 INJECTION, SOLUTION INTRAVENOUS at 12:14

## 2023-12-17 RX ADMIN — MORPHINE SULFATE 4 MILLIGRAM(S): 50 CAPSULE, EXTENDED RELEASE ORAL at 12:15

## 2023-12-17 RX ADMIN — MORPHINE SULFATE 4 MILLIGRAM(S): 50 CAPSULE, EXTENDED RELEASE ORAL at 08:00

## 2023-12-17 RX ADMIN — MORPHINE SULFATE 4 MILLIGRAM(S): 50 CAPSULE, EXTENDED RELEASE ORAL at 17:33

## 2023-12-17 RX ADMIN — Medication 650 MILLIGRAM(S): at 01:40

## 2023-12-17 NOTE — PROGRESS NOTE ADULT - PROBLEM SELECTOR PLAN 6
pt endorses long hx of alcohol consumption, currently repots consuming moderate amounts of wine multiple days per week. quit drinking x1 year but resumed etoh consumption during the pandemic. ed danette <3, ciwa score 0  pt offered referrals to counseling services, as he acknowledges his etoh consumption is problematic, plans to stop drinking & states he feels he is equipped to do so on his own, as he has done in the past. offered counseling resources/referrals but pt is not currently interested. encouraged to reach out for services as desired  ketonuria likely from acute alcohol consumption versus prolonged starvation  - consider thiamine, b12, folate supplementation  - pt's reported last drink was Tuesday, which puts him out of window of acute withdrawal, denies any hx of wd sx.    PLAN:  -ROSALIND protocol

## 2023-12-17 NOTE — PROGRESS NOTE ADULT - SUBJECTIVE AND OBJECTIVE BOX
SUBJECTIVE:  Pt seen at bedside, sleeping comfortably. Pt endorses no nausea/vomiting, passing flatus/BM x2. Pt endorses pain is well controlled under current regimen w/ significant improvement since admission.     MEDICATIONS  (STANDING):  acetaminophen     Tablet .. 650 milliGRAM(s) Oral every 6 hours  atorvastatin 20 milliGRAM(s) Oral at bedtime  enoxaparin Injectable 40 milliGRAM(s) SubCutaneous every 24 hours  influenza   Vaccine 0.5 milliLiter(s) IntraMuscular once  lactated ringers. 1000 milliLiter(s) (100 mL/Hr) IV Continuous <Continuous>  levothyroxine 150 MICROGram(s) Oral daily  polyethylene glycol 3350 17 Gram(s) Oral every 12 hours  senna 2 Tablet(s) Oral at bedtime  sodium chloride 0.9%. 1000 milliLiter(s) (100 mL/Hr) IV Continuous <Continuous>    MEDICATIONS  (PRN):  melatonin 3 milliGRAM(s) Oral at bedtime PRN Insomnia  morphine  - Injectable 4 milliGRAM(s) IV Push every 4 hours PRN Severe Pain (7 - 10)      Vital Signs Last 24 Hrs  T(C): 36.8 (17 Dec 2023 11:36), Max: 37.4 (16 Dec 2023 21:20)  T(F): 98.2 (17 Dec 2023 11:36), Max: 99.4 (16 Dec 2023 21:20)  HR: 107 (17 Dec 2023 11:36) (97 - 124)  BP: 134/76 (17 Dec 2023 11:36) (134/76 - 153/95)  BP(mean): --  RR: 18 (17 Dec 2023 11:36) (17 - 19)  SpO2: 93% (17 Dec 2023 11:36) (93% - 95%)    Parameters below as of 17 Dec 2023 11:36  Patient On (Oxygen Delivery Method): room air        Physical Exam:  General: NAD, resting comfortably in bed  Pulmonary: Nonlabored breathing, no respiratory distress  Cardiovascular: NSR  Abdominal: soft, mild ttp midepigastrum/R-lateral abdomen, moderately distended   Extremities: WWP, normal strength  Neuro: A/O x 3, CNs II-XII grossly intact, no focal deficits    I&O's Summary    16 Dec 2023 07:01  -  17 Dec 2023 07:00  --------------------------------------------------------  IN: 711 mL / OUT: 600 mL / NET: 111 mL        LABS:                        12.5   11.92 )-----------( 206      ( 17 Dec 2023 05:30 )             39.5     12-17    139  |  101  |  10  ----------------------------<  129<H>  3.9   |  26  |  0.95    Ca    8.1<L>      17 Dec 2023 05:30  Phos  1.6     12-17  Mg     2.4     12-17    TPro  6.9  /  Alb  3.4  /  TBili  2.8<H>  /  DBili  x   /  AST  42<H>  /  ALT  28  /  AlkPhos  60  12-17    PT/INR - ( 17 Dec 2023 05:30 )   PT: 12.4 sec;   INR: 1.09            Urinalysis Basic - ( 17 Dec 2023 05:30 )    Color: x / Appearance: x / SG: x / pH: x  Gluc: 129 mg/dL / Ketone: x  / Bili: x / Urobili: x   Blood: x / Protein: x / Nitrite: x   Leuk Esterase: x / RBC: x / WBC x   Sq Epi: x / Non Sq Epi: x / Bacteria: x      CAPILLARY BLOOD GLUCOSE        LIVER FUNCTIONS - ( 17 Dec 2023 05:30 )  Alb: 3.4 g/dL / Pro: 6.9 g/dL / ALK PHOS: 60 U/L / ALT: 28 U/L / AST: 42 U/L / GGT: x             RADIOLOGY & ADDITIONAL STUDIES:

## 2023-12-17 NOTE — PROGRESS NOTE ADULT - PROBLEM SELECTOR PLAN 7
PMHx hyperthyroidism, s/p total thyroidectomy for malignant nodule, takes synthroid 150mcg every morning except sundays, on which days pt reports he takes 300mcg  - TSH: 3.020, wnl    PLAN:  - continue synthroid 150mcg qd

## 2023-12-17 NOTE — PROGRESS NOTE ADULT - SUBJECTIVE AND OBJECTIVE BOX
Patient is a 48y old  Male who presents with a chief complaint of pancreatitis (16 Dec 2023 17:01)    INTERVAL EVENTS: GARETT    SUBJECTIVE:  Patient was seen and examined at bedside. Patient reports continued pain although appears to have improved. Has passed gas and had a small BM.     Review of systems: Patient denies: fever, chills, dizziness, HA, Changes in vision, CP, dyspnea, nausea or vomiting, dysuria, changes in bowel movements, LE edema. Rest of 12 point Review of systems negative unless otherwise documented elsewhere in note.     Diet, Clear Liquid (12-16-23 @ 13:54) [Active]      MEDICATIONS:  MEDICATIONS  (STANDING):  atorvastatin 20 milliGRAM(s) Oral at bedtime  enoxaparin Injectable 40 milliGRAM(s) SubCutaneous every 24 hours  influenza   Vaccine 0.5 milliLiter(s) IntraMuscular once  lactated ringers. 1000 milliLiter(s) (100 mL/Hr) IV Continuous <Continuous>  levothyroxine 150 MICROGram(s) Oral daily  polyethylene glycol 3350 17 Gram(s) Oral every 12 hours  potassium phosphate IVPB 15 milliMole(s) IV Intermittent once  senna 2 Tablet(s) Oral at bedtime  sodium chloride 0.9%. 1000 milliLiter(s) (100 mL/Hr) IV Continuous <Continuous>    MEDICATIONS  (PRN):  acetaminophen     Tablet .. 650 milliGRAM(s) Oral every 6 hours PRN Mild Pain (1 - 3)  melatonin 3 milliGRAM(s) Oral at bedtime PRN Insomnia  morphine  - Injectable 4 milliGRAM(s) IV Push every 4 hours PRN Severe Pain (7 - 10)      Allergies    No Known Allergies    Intolerances        OBJECTIVE:  Vital Signs Last 24 Hrs  T(C): 36.4 (17 Dec 2023 05:26), Max: 37.4 (16 Dec 2023 21:20)  T(F): 97.6 (17 Dec 2023 05:26), Max: 99.4 (16 Dec 2023 21:20)  HR: 97 (17 Dec 2023 05:26) (97 - 124)  BP: 147/95 (17 Dec 2023 05:26) (147/95 - 153/95)  BP(mean): --  RR: 17 (17 Dec 2023 05:26) (17 - 19)  SpO2: 95% (17 Dec 2023 05:26) (93% - 95%)    Parameters below as of 16 Dec 2023 21:20  Patient On (Oxygen Delivery Method): room air      I&O's Summary    16 Dec 2023 07:01  -  17 Dec 2023 07:00  --------------------------------------------------------  IN: 711 mL / OUT: 600 mL / NET: 111 mL        PHYSICAL EXAM:  Gen: Reclining in bed at time of exam, appears stated age  HEENT: NCAT, MMM, clear OP  Neck: supple, trachea at midline  CV: RRR, +S1/S2  Pulm: adequate respiratory effort, no increase in work of breathing  Abd: soft, distended, TTP in epigastric  Skin: warm and dry, no new rashes vs prior report  Ext: WWP, no LE edema  Neuro: AOx3, no gross focal neurological deficits  Psych: affect and behavior appropriate, pleasant at time of interview    LABS:                        12.5   11.92 )-----------( 206      ( 17 Dec 2023 05:30 )             39.5     12-17    139  |  101  |  10  ----------------------------<  129<H>  3.9   |  26  |  0.95    Ca    8.1<L>      17 Dec 2023 05:30  Phos  1.6     12-17  Mg     2.4     12-17    TPro  6.9  /  Alb  3.4  /  TBili  2.8<H>  /  DBili  x   /  AST  42<H>  /  ALT  28  /  AlkPhos  60  12-17    LIVER FUNCTIONS - ( 17 Dec 2023 05:30 )  Alb: 3.4 g/dL / Pro: 6.9 g/dL / ALK PHOS: 60 U/L / ALT: 28 U/L / AST: 42 U/L / GGT: x           PT/INR - ( 17 Dec 2023 05:30 )   PT: 12.4 sec;   INR: 1.09            CAPILLARY BLOOD GLUCOSE        Urinalysis Basic - ( 17 Dec 2023 05:30 )    Color: x / Appearance: x / SG: x / pH: x  Gluc: 129 mg/dL / Ketone: x  / Bili: x / Urobili: x   Blood: x / Protein: x / Nitrite: x   Leuk Esterase: x / RBC: x / WBC x   Sq Epi: x / Non Sq Epi: x / Bacteria: x        MICRODATA:    Culture - Urine (collected 15 Dec 2023 05:00)  Source: Clean Catch Clean Catch (Midstream)  Final Report (16 Dec 2023 15:05):    <10,000 CFU/mL Normal Urogenital Marily        RADIOLOGY/OTHER STUDIES:

## 2023-12-17 NOTE — PROGRESS NOTE ADULT - PROBLEM SELECTOR PLAN 1
-Pt p/w epigastric pain radiating to back, attributes to past episodes of pancreatitis, Lipase >375.   -Likely 2/2 chronic etoh consumption (reports 4 glasses of wine 5 days/week) and hypertriglyceridemia given Triglycerides= 1660, TG now decreased to 660  -[place]'s criteria: 0  -CT A/P: Acute pancreatitis. Possible small early necrotic change at the junction of body and tail.    PLAN:  - Continue 1L NS 100cc/hr x10 hrs  - Pain regimen:     ~Tylenol 650mg Q6     ~IV Morphine 4mg Q4 PRN  - General surgery consulted re: necrotic changes and decreased bowel motility, appreciate recs  - monitor Hct (goal 35-44%)  -monitor BUN (increase IVF if BUN remains the same or increases)  - clear liquid diet, advance as tolerated  -Endocrine consult

## 2023-12-17 NOTE — PROGRESS NOTE ADULT - PROBLEM SELECTOR PLAN 5
Likely 2/2 Alcohol use.  -CT A/P: hepatosteatosis  -FIB-4: 1.25, therefore Low fibrosis score and is not appropriate candidate that requires liver biopsy    PLAN:  - continue to monitor AST/ALTs  - offer alcohol use cessation counseling (see below)

## 2023-12-17 NOTE — PROGRESS NOTE ADULT - PROBLEM SELECTOR PLAN 2
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    -f/u Urine lytes  -Continue to monitor CBC

## 2023-12-17 NOTE — CONSULT NOTE ADULT - CONSULT REASON
7/18/2022         RE: Sandor Puentes  [address]  [address]  [place] [address]        Dear Colleague,    Thank you for referring your patient, Sandor Puentes, to the [facility]. Please see a copy of my visit note below.    [facility] Hematology and Oncology Outpatient Progress Note    Patient: Sandor Puentes  MRN: [id number]  Date of Service: Jul 18, 2022          Reason for Visit    1. Recurrent/stage IV urothelial cancer    Primary Hematologist/Oncologist: Dr. Jones ([facility])      Assessment/Plan  1. Recurrent/stage IV urothelial cancer  Recently confirmed metastatic urothelial cancer involving pericolonic soft tissue, mesentery and several sites of nodes. He presented with several months of illness/weight loss, but his presenting symptoms have been improved. He is gaining weight. He is still generally weaker than his baseline, PS 2. He is generally healthy for his age.     Dr. Jones recommended palliative immunotherapy with Pembrolizumab. I reviewed this regimen in detail with he and his wife and he desires to proceed.     Labs: CMP and TSH adequate.     Plan:  -Proceed with first cycle Pembrolizumab  -Return in 3 + 6 weeks with me, ahead of cycles 2 + 3  -Scheduled back with Dr. Jones in 9 weeks, will get reimaging PET prior.      2. Colonic stricture  Noted during colonoscopy, likely related to adhesions and possibly pericolonic soft tissue mets causing extrinsic compression. Currently, having regular BMs daily as baseline. Rare abd pain.     Plan:  -Recommend keeping bowels soft and regular through diet, hydration, activity. Senna-S 1-2 daily if needed.    3. Anemia  Hgb stable/improved 8.9 currently. Follow.     4. LE edema  Likely related to hypoalbuminemia, inactivity and possibly lymphedema. Elevate legs and increase protein intake.     ______________________________________________________________________________    History of Present Illness/ Interval 
acute pancreatitis
History    Mr. Sandor Puentes  is a 89 year old with recently diagnosed recurrent/met high-grade urothelial cancer involving nodes and pericolonic/mesenteric soft tissue. He's had progressive symptoms (abd pain, diarrhea and weight loss) over last 6 months. He has adhesions/external stricture of sigmoid colon. He met Dr. Jones a few weeks ago and presents to start his first cycle of palliative Pembrolizumab at the [facility].    He is doing fairly well. Main ongoing symptom is general weakness, especially in mornings.  Less active than he was earlier in year before getting sick, when he had been walking 45 min/day. Not able to maintain that now. Independent in ADLs.  BMs are regular now - 1 soft BM/day  Occasional lower abd pain, transient and not taking anything for pain.  Appetite/weight improved, eating more and regaining weight.  LE edema is newer these past few months.  He feels intermittently dizzy, especially with position changes. No dyspnea nor chest pain.      ECOG Performance     2      Oncology History/Treatment  Diagnosis/Stage:   2014: bladder cancer + prostate cancer    6/2022: recurrent high-grade urothelial cancer, involving pericolonic soft tissue  -Early, 2022, while in [place] hospitalized x 2 for low abd pain and severe diarrhea. He lost 45 lb over several weeks. It does appear exact etiology was found  -4/13/2022 CT abd/pelvis: circumferential wall thickening and narrowing of mid-descending colon with adjacent extraluminal soft tissue deposits, concerning for malignant stricture with adjacent peritoneal malignant deposits. 1.2 cm LN at mesenteric root, likely metastatic. Stable 1.5 cm L adrenal nodule  -April-May, 2022 returned to [place]. Hospitalized and colonoscopy x 2 attempted but not able to traverse the sigmoid colon due to adhesions  -6/14/2022 CT guided biopsy retroperitoneal pericolonic mass: carcinoma involving soft tissue, morphologically consistent with prior high-grade urothelial cancer. 
Tumor cells positive for Cytokeratin AE1/AE3 and CK5, while negative for CK7, CK20, SHANE-3, p63, TTF-1, CDX-2, PAX8, PSA, NKX3.1, Inhibin and Androgen receptor (AR)  -PET: Multiple met lymphadenopathy (mesentary, aortacaval region, L paratracheal, L supraclav), soft tissue nodularity in mesentery and adjacent to descending colon. Left adrenal nodule favored adenoma    Treatment:  2014: RT (44 fx) prostate    2014: cystoprosatectomy for bladder and prostate cancer      Physical Exam    GENERAL: Alert and oriented to time place and person. Seated comfortably. In no distress. Hard of hearing. Wife accompanies.  HEAD: Atraumatic and normocephalic. No alopecia.  EYES: ИВАН, EOMI. No erythema. No icterus.  LYMPH NODES: No palpable supraclavicular, cervical or inguinal lymphadenopathy.  CHEST: clear to auscultation bilaterally. Resonant to percussion throughout bilaterally. Symmetrical breath movements bilaterally.  CVS: RRR  ABDOMEN: Soft. Not tender. Not distended. No palpable hepatomegaly or splenomegaly. No other mass palpable. Bowel sounds present.  EXTREMITIES: Warm. 1+ pitting edema bilateral distal legs.  SKIN: no rash, or bruising or purpura.   NEURO: No gross deficit noted. Non-antalgic gait.      Lab Results    Recent Results (from the past 168 hour(s))   CBC with platelets   Result Value Ref Range    WBC Count 4.3 4.0 - 11.0 10e3/uL    RBC Count 3.89 (L) 4.40 - 5.90 10e6/uL    Hemoglobin 8.9 (L) 13.3 - 17.7 g/dL    Hematocrit 30.2 (L) 40.0 - 53.0 %    MCV 78 78 - 100 fL    MCH 22.9 (L) 26.5 - 33.0 pg    MCHC 29.5 (L) 31.5 - 36.5 g/dL    RDW 24.1 (H) 10.0 - 15.0 %    Platelet Count 121 (L) 150 - 450 10e3/uL   TSH with free T4 reflex   Result Value Ref Range    TSH 1.88 0.40 - 4.00 mU/L   Comprehensive metabolic panel   Result Value Ref Range    Sodium 143 133 - 144 mmol/L    Potassium 4.1 3.4 - 5.3 mmol/L    Chloride 113 (H) 94 - 109 mmol/L    Carbon Dioxide (CO2) 25 20 - 32 mmol/L    Anion Gap 5 3 - 14 mmol/L 
   Urea Nitrogen 19 7 - 30 mg/dL    Creatinine 1.21 0.66 - 1.25 mg/dL    Calcium 9.0 8.5 - 10.1 mg/dL    Glucose 83 70 - 99 mg/dL    Alkaline Phosphatase 67 40 - 150 U/L    AST 9 0 - 45 U/L    ALT 12 0 - 70 U/L    Protein Total 6.5 (L) 6.8 - 8.8 g/dL    Albumin 3.2 (L) 3.4 - 5.0 g/dL    Bilirubin Total 0.3 0.2 - 1.3 mg/dL    GFR Estimate 56 (L) >60 mL/min/1.73m2       Imaging    PET Oncology Whole Body    Result Date: 6/29/2022  Combined Report of:    PET and CT on  6/29/2022 12:01 PM : 1. PET of the neck, chest, abdomen, and pelvis. 2. PET CT Fusion for Attenuation Correction and Anatomical Localization:  3. Diagnostic CT scan of the chest, abdomen, and pelvis with intravenous contrast for interpretation. 4. CT of the chest, abdomen and pelvis obtained for diagnostic interpretation. 5. 3D MIP and PET-CT fused images were processed on an independent workstation and archived to PACS and reviewed by a radiologist. Technique: 1. PET: The patient received 14.05 mCi of F-18-FDG; the serum glucose was 83 prior to administration, body weight was 79.7 kg. Images were evaluated in the axial, sagittal, and coronal planes as well as the rotational whole body MIP. Images were acquired from the Vertex to the Feet. UPTAKE WAS MEASURED AT 60 MINUTES. BACKGROUND:  Liver SUV max= 3,   Aorta Blood SUV Max: 2.4. 2. CT: Volumetric acquisition for clinical interpretation of the chest, abdomen, and pelvis acquired at 3 mm sections . The chest, abdomen, and pelvis were evaluated at 5 mm sections in bone, soft tissue, and lung windows.  The patient received 100 cc of Isovue 370 intravenously for the examination.  3. 3D MIP and PET-CT fused images were processed on an independent workstation and archived to PACS and reviewed by a radiologist. INDICATION: Bladder cancer - metastatic; Malignant neoplasm of overlapping sites of bladder (H) ADDITIONAL INFORMATION OBTAINED FROM EMR: He was diagnosed with bladder cancer in 2014. He was referred 
to urologist who did cystscopy and then to Dr. Puri. He then had TURBT done by Dr. Puri. He was recommended cystectomy. ? He was diagnosed with prostate cancer and treated with radiation in 44 fractions. He then presented with lower abdomen pain and kidney cancer was suspected. But later it was ?Diagnosed as bladder cancer and he had cystoprostatectomy in 2014 performed by Dr. Puri. COMPARISON: CT abdomen 4/13/2022. FINDINGS: HEAD/NECK: There is no  suspicious FDG uptake in the neck. The paranasal sinuses are clear. The mastoid air cells are clear. The mucosal pharyngeal space, the , prevertebral and carotid spaces are within normal limits. No masses, mass effect or pathologically enlarged lymph nodes are evident. The thyroid gland is unremarkable. CHEST: Hypermetabolic enlarged left upper paratracheal/supraclavicular node with SUV max of There are no pathologically enlarged mediastinal, hilar or axillary lymph nodes. There are no suspicious lung nodules or evidence for infection. Moderate centrilobular emphysema. There is no significant pericardial or pleural effusions. Median sternotomy. Cardiomegaly. Moderate atherosclerosis of the coronary arteries. ABDOMEN AND PELVIS: Cystectomy and right lower quadrant ileal urinary conduit. Multiple hypermetabolic lymphadenopathy in the mesentery and aorta caval retroperitoneum, for example 1.7 cm hypermetabolic node in the mesentery adjacent to SMA with SUV max of 19.4 (Series 5 image 276), left paraaortic node measures 1.4 cm with SUV max of 23.4 (Series 5 image 309) and right paraaortic node adjacent to diagram bhargavi with SUV max of 14.8 (Series 5 image 264). Additional hypermetabolic soft tissue thickening adjacent to descending colon measures 1.7 cm with SUV max of 17 (Series 5 image 301). There are no suspicious hepatic lesions. Stable mildly atrophic left hepatic lobe. There is no splenomegaly or evidence for splenic or pancreatic mass lesion. There are 
no suspicious adrenal mass lesions. Stable 1.5 cm left adrenal nodule without abnormal uptake. Cholecystectomy. There is symmetric nephrographic renal phase without hydronephrosis. Left kidney is mildly atrophic. Cortical renal cysts. There is no evidence for bowel obstruction or free fluid. Stable parastomal hernia containing multiple nonobstructed loops of small bowel. Colonic diverticulosis without evidence of acute diverticulitis. Duodenal diverticulum. Mild anasarca. LOWER EXTREMITIES: No abnormal masses or hypermetabolic lesions. BONES: There are no suspicious lytic or blastic osseous lesions.  There is no abnormal FDG uptake in the skeleton. Multilevel spondylosis.     IMPRESSION: In this patient with history of bladder cancer status post cystectomy and right lower quadrant ileal urinary conduit; 1. Multiple metastatic lymphadenopathies and soft tissue nodularity in the mesentery, aorta caval region and left upper paratracheal/supraclavicular region, including hypermetabolic soft tissue nodularity adjacent to descending colon biopsied as metastasis. 2. Stable left adrenal 1.5 cm nodule without abnormal uptake, likely an adenoma given the stability. Attention on follow-up. 3. Post Lasix high-resolution images of the pelvis better defines the mesenteric and bowel involvement. I have personally reviewed the examination and initial interpretation and I agree with the findings. LAURA VARELA MD   SYSTEM ID:  [id number]    IR Chest Port Placement > 5 Yrs of Age    Result Date: 7/13/2022  PORT PLACEMENT 7/12/2022 1:45 PM HISTORY: Neoplasm COMPARISON: None. DESCRIPTION OF PROCEDURE: After obtaining informed consent, the patient was placed in a supine position on the fluoroscopy table. The neck was prepped and draped in the usual sterile manner. Ultrasound was used to evaluate and document patency of the internal jugular vein. One percent lidocaine was injected for local anesthesia. Under sterile ultrasound guidance, 
a puncture was made into the internal jugular vein. A permanent copy image was obtained for the patient's records. A 6 Sami peel-away sheath was placed into the vein. The chest and neck were anesthetized with lidocaine. A 2 cm skin incision was made in the chest. A pocket for a port was created using blunt dissection. The pocket was washed with antibiotic-laden saline. The pocket was packed with antibiotic-laden gauze. A tunnel for the port was created from the pocket to the neck. The port catheter was pulled through, and the attached port was then placed into the pocket. The port was secured in the pocket using 2 Ethilon around the port nozzle. The catheter was measured to appropriate length and was placed through the peel-away sheath. The tip was positioned in the mid to high right atrium. The pocket was closed with three 3-0 Vicryl deep interrupted stitches and Dermabond. The neck incision site was closed with Dermabond. The port was accessed, aspirated, flushed with saline and heparin locked. A dressing was applied. A fluoroscopic image was saved to document tip of catheter in satisfactory position. I determined this patient to be an appropriate candidate for the planned sedation and procedure and reassessed the patient immediately prior to sedation and procedure. Moderate intravenous conscious sedation was supervised by me. The patient was independently monitored by a registered nurse assigned to the Department of radiology using automated blood pressure, EKG and pulse oximetry. The patient tolerated the procedure well. There were no immediate postprocedure complications. The patient's vital signs were monitored by radiology nursing staff under my supervision and remained stable throughout the study. Radiation dose for this scan was reduced using automated exposure control, adjustment of the mA and/or kV according to patient size, or iterative reconstruction technique. Maximal Sterile Barrier Technique 
"Utilized: Cap AND mask AND sterile gown AND sterile gloves AND sterile full body drape AND hand hygiene AND skin preparation 2% chlorhexidine for cutaneous antisepsis (or acceptable alternative antiseptics).   Sterile Ultrasound Technique Utilized ?Sterile gel AND sterile probe covers. MEDICATIONS: 1.5 mg Versed, 50 mcg fentanyl SEDATION TIME: 20 minutes FLUOROSCOPY TIME: .3 minutes RADIATION DOSE: 1.54 mGy NUMBER OF SPOT FLUOROSCOPIC IMAGES: 1     IMPRESSION: Power port placed as above. The port is ready to use. DIONICIO HAYWOOD MD   SYSTEM ID:  [id number]      Billing  Total time 45 minutes, to include face to face visit, review of EMR, ordering, documentation and coordination of care on date of service    Signed by: Nenita Jaffe NP    Oncology Rooming Note    July 18, 2022 1:01 PM   Sandor Puentes is a 89 year old male who presents for:    No chief complaint on file.    Initial Vitals: /55 (BP Location: Right arm)   Temp 98.2  F (36.8  C) (Oral)   Wt 77.8 kg (171 lb 9.6 oz)   BMI 26.48 kg/m   Estimated body mass index is 26.48 kg/m  as calculated from the following:    Height as of 7/12/22: 1.715 m (5' 7.5\").    Weight as of this encounter: 77.8 kg (171 lb 9.6 oz). Body surface area is 1.92 meters squared.  Data Unavailable Comment: Data Unavailable   No LMP for male patient.  Allergies reviewed: Yes  Medications reviewed: Yes    Medications: Medication refills not needed today.  Pharmacy name entered into [facility]:    [facility] - [place], [place] - [address] [place] [address] [id number] - [place], [place] - [address] [facility] [address] [place] [address] [facility] #[id number] - [place], [place] - [address] AT [facility]  [facility] [address] MEDICAL [place]    Clinical concerns: No new concerns Nenita Jaffe was notified.      Deena Cao RN                    Again, thank you for allowing me to participate in the care of your "
Hypertriglyceridemia
patient.        Sincerely,        Nenita Jaffe, NP

## 2023-12-17 NOTE — PROGRESS NOTE ADULT - PROBLEM SELECTOR PLAN 4
Resolving  Likely Reactive 2/2 given acute pancreatitis.  Now downtrending, 16.12>14.03>13.45    -Continue to monitor CBC

## 2023-12-17 NOTE — PROGRESS NOTE ADULT - PROBLEM SELECTOR PLAN 3
Likely a complication of acute pancreatitis.  CT A/P reveal Possible small early necrotic change at the junction of body and tail.  8.5>7.7>8.2    -Continue to monitor Ca+2

## 2023-12-17 NOTE — CONSULT NOTE ADULT - SUBJECTIVE AND OBJECTIVE BOX
History of Present Illness: Mr. Montgomery is a 48 year-old man with a history of alcohol-induced pancreatitis and thyroid cancer admitted with alcohol-induced pancreatitis. We were consulted for assistance in management of hypertriglyceridemia.    He takes rosuvastatin for hyperlipidemia as an outpatient; unknown dose. Triglycerides initially 1660 mg/dL and subsequently 660 mg/dL. Glucose values 128-177 mg/dL.     He was diagnosed with thyroid cancer in 2015 and is status post total thyroidectomy. He follows with Dr. Tricia Noonan every 6 months. He takes Synthroid brand 150 mcg, 1 pill 6 days/week and 1.5 pills 1 day/week (average daily dose 161 mcg). TSH 3.20 uIU/mL during hospital admission.     Serum calcium corrected for low albumin borderline low.     PMH & Surgical Hx:ABDOMINAL PAIN    Handoff    MEWS Score    Pancreatitis    HTN (hypertension)    HLD (hyperlipidemia)    Hypothyroid    Anxiety    Acute pancreatitis    Acute pancreatitis    HTN (hypertension)    HLD (hyperlipidemia)    Anxiety    Hypothyroidism    Dysfunctional alcohol use    Fatty liver    Leukocytosis    Hypocalcemia    Hyponatremia    SIRS without infection or organ dysfunction    H/O thyroidectomy    ABDOMINAL PAIN    90+    Alcohol use    SysAdmin_VstLnk        FH: Noncontributory.    SH: Alcohol use as abov.e     Current Meds:  acetaminophen     Tablet .. 650 milliGRAM(s) Oral every 6 hours  atorvastatin 20 milliGRAM(s) Oral at bedtime  enoxaparin Injectable 40 milliGRAM(s) SubCutaneous every 24 hours  influenza   Vaccine 0.5 milliLiter(s) IntraMuscular once  lactated ringers. 1000 milliLiter(s) IV Continuous <Continuous>  levothyroxine 150 MICROGram(s) Oral daily  melatonin 3 milliGRAM(s) Oral at bedtime PRN  morphine  - Injectable 4 milliGRAM(s) IV Push every 4 hours PRN  polyethylene glycol 3350 17 Gram(s) Oral every 12 hours  senna 2 Tablet(s) Oral at bedtime  sodium chloride 0.9%. 1000 milliLiter(s) IV Continuous <Continuous>      Allergies:  No Known Allergies      ROS:  Denies the following except as indicated.    General: weight loss/weight gain, decreased appetite, fatigue  Eyes: Blurry vision, double vision, visual changes  ENT: Throat pain, changes in voice,   CV: palpitations, SOB, CP, cough  GI: NVD, difficulty swallowing, abdominal pain  : polyuria, dysuria  Endo: abnormal menses, temperature intolerance, decreased libido  MSK: weakness, joint pain  Skin: rash, dryness, diaphoresis  Heme: Easy bruising,bleeding  Neuro: HA, dizziness, lightheadedness, numbness tingling  Psych: Anxiety, Depression    Vital Signs Last 24 Hrs  T(C): 36.8 (17 Dec 2023 11:36), Max: 37.4 (16 Dec 2023 21:20)  T(F): 98.2 (17 Dec 2023 11:36), Max: 99.4 (16 Dec 2023 21:20)  HR: 107 (17 Dec 2023 11:36) (97 - 124)  BP: 134/76 (17 Dec 2023 11:36) (134/76 - 153/95)  BP(mean): --  RR: 18 (17 Dec 2023 11:36) (17 - 19)  SpO2: 93% (17 Dec 2023 11:36) (93% - 95%)    Parameters below as of 17 Dec 2023 11:36  Patient On (Oxygen Delivery Method): room air      Height (cm): 188 (12-15 @ 04:48)  Weight (kg): 102.1 (12-15 @ 04:48)  BMI (kg/m2): 28.9 (12-15 @ 04:48)      Constitutional: wn/wd in NAD.   HEENT: NCAT, MMM, OP clear, EOMI, , no proptosis or lid retraction  Neck: no thyromegaly or palpable thyroid nodules   Respiratory: lungs CTAB.  Cardiovascular: regular rhythm, normal S1 and S2, no audible murmurs, no peripheral edema  GI: soft, NT/ND, no masses/HSM appreciated.  Neurology: no tremors, DTR 2+  Skin: no visible rashes/lesions  Psychiatric: AAO x 3, normal affect/mood.  Ext: radial pulses intact, DP pulses intact, extremities warm, no cyanosis, clubbing or edema.       LABS:                        12.5   11.92 )-----------( 206      ( 17 Dec 2023 05:30 )             39.5     12-17    139  |  101  |  10  ----------------------------<  129<H>  3.9   |  26  |  0.95    Ca    8.1<L>      17 Dec 2023 05:30  Phos  1.6     12-17  Mg     2.4     12-17    TPro  6.9  /  Alb  3.4  /  TBili  2.8<H>  /  DBili  x   /  AST  42<H>  /  ALT  28  /  AlkPhos  60  12-17    PT/INR - ( 17 Dec 2023 05:30 )   PT: 12.4 sec;   INR: 1.09            Urinalysis Basic - ( 17 Dec 2023 05:30 )    Color: x / Appearance: x / SG: x / pH: x  Gluc: 129 mg/dL / Ketone: x  / Bili: x / Urobili: x   Blood: x / Protein: x / Nitrite: x   Leuk Esterase: x / RBC: x / WBC x   Sq Epi: x / Non Sq Epi: x / Bacteria: x        Thyroid Stimulating Hormone, Serum: 3.020 (12-16 @ 05:30)      RADIOLOGY & ADDITIONAL STUDIES:  CAPILLARY BLOOD GLUCOSE      Impression/Recommendations: Mr. Montgomery is a 48 year-old man with a history of alcohol-induced pancreatitis and thyroid cancer admitted with alcohol-induced pancreatitis. We were consulted for assistance in management of hypertriglyceridemia.    1. Hypertriglyceridemia. His triglycerides have trended down with NPO/clear liquid status. No clear indication for an insulin drip at this time. We reviewed dietary interventions for hypertriglyceridemia, including reducing alcohol and fat intake. He may benefit from addition of fish oil and/or a fibrate in addition to statin therapy as an outpatient.     2. Borderline low corrected calcium. Recommend monitoring of serum calcium this afternoon.     3. History of thyroid cancer. Postoperative hypothyroidism. His TSH is within range on his current regimen of levothyroxine and recommend continuing for now.     4. Hyperglycemia. Recommend measurement of HbA1c.    Thank you for this consult. We will continue to monitor.

## 2023-12-17 NOTE — PROGRESS NOTE ADULT - ASSESSMENT
49yo M PMHx HTN, HLD, anxiety, hypothyroidism s/p total thyroidectomy for thyroid cancer (2015), ETOH abuse, multiple prior hospitalizations for EtOH pancreatitis in the past (on approx 2 additional occasions), who presents with epigastric pain x2 days, found to have a recurrent episode of alcoholic pancreatitis. General surgery consulted for further evaluation of necrotic collection noted on CT. Currently, patient reports feeling progressively distended and reports not passing flatus or having BMs since Thursday. Patient has been taking morphine RTC since admission, contributing as well. Dx c/w acute pancreatitis with 3.5cm area of early necrotic change vs focal edema, will likely a component of ileus secondary to pancreatitis and opioid use.     Recommendations:  - f/u RUQ US; if there is cholelithiasis will consider cholecystectomy on this admission   - No acute surgical intervention needed at this time  - Recommend keeping fluids at 120cc/hr  - Advance diet as tolerated  - Strict I/Os - please ensure nurse documents accurately  Team 1C will continue to follow, please call with any questions or concerns  Plan discussed with chief resident and attending, Dr. Garrido.

## 2023-12-18 LAB
A1C WITH ESTIMATED AVERAGE GLUCOSE RESULT: 5.9 % — HIGH (ref 4–5.6)
A1C WITH ESTIMATED AVERAGE GLUCOSE RESULT: 5.9 % — HIGH (ref 4–5.6)
ANION GAP SERPL CALC-SCNC: 11 MMOL/L — SIGNIFICANT CHANGE UP (ref 5–17)
ANION GAP SERPL CALC-SCNC: 11 MMOL/L — SIGNIFICANT CHANGE UP (ref 5–17)
BUN SERPL-MCNC: 8 MG/DL — SIGNIFICANT CHANGE UP (ref 7–23)
BUN SERPL-MCNC: 8 MG/DL — SIGNIFICANT CHANGE UP (ref 7–23)
CALCIUM SERPL-MCNC: 8.3 MG/DL — LOW (ref 8.4–10.5)
CALCIUM SERPL-MCNC: 8.3 MG/DL — LOW (ref 8.4–10.5)
CHLORIDE SERPL-SCNC: 99 MMOL/L — SIGNIFICANT CHANGE UP (ref 96–108)
CHLORIDE SERPL-SCNC: 99 MMOL/L — SIGNIFICANT CHANGE UP (ref 96–108)
CO2 SERPL-SCNC: 25 MMOL/L — SIGNIFICANT CHANGE UP (ref 22–31)
CO2 SERPL-SCNC: 25 MMOL/L — SIGNIFICANT CHANGE UP (ref 22–31)
CREAT SERPL-MCNC: 0.78 MG/DL — SIGNIFICANT CHANGE UP (ref 0.5–1.3)
CREAT SERPL-MCNC: 0.78 MG/DL — SIGNIFICANT CHANGE UP (ref 0.5–1.3)
EGFR: 109 ML/MIN/1.73M2 — SIGNIFICANT CHANGE UP
EGFR: 109 ML/MIN/1.73M2 — SIGNIFICANT CHANGE UP
ESTIMATED AVERAGE GLUCOSE: 123 MG/DL — HIGH (ref 68–114)
ESTIMATED AVERAGE GLUCOSE: 123 MG/DL — HIGH (ref 68–114)
GLUCOSE SERPL-MCNC: 124 MG/DL — HIGH (ref 70–99)
GLUCOSE SERPL-MCNC: 124 MG/DL — HIGH (ref 70–99)
HCT VFR BLD CALC: 35.5 % — LOW (ref 39–50)
HCT VFR BLD CALC: 35.5 % — LOW (ref 39–50)
HGB BLD-MCNC: 11.2 G/DL — LOW (ref 13–17)
HGB BLD-MCNC: 11.2 G/DL — LOW (ref 13–17)
MAGNESIUM SERPL-MCNC: 2.5 MG/DL — SIGNIFICANT CHANGE UP (ref 1.6–2.6)
MAGNESIUM SERPL-MCNC: 2.5 MG/DL — SIGNIFICANT CHANGE UP (ref 1.6–2.6)
MCHC RBC-ENTMCNC: 30.9 PG — SIGNIFICANT CHANGE UP (ref 27–34)
MCHC RBC-ENTMCNC: 30.9 PG — SIGNIFICANT CHANGE UP (ref 27–34)
MCHC RBC-ENTMCNC: 31.5 GM/DL — LOW (ref 32–36)
MCHC RBC-ENTMCNC: 31.5 GM/DL — LOW (ref 32–36)
MCV RBC AUTO: 98.1 FL — SIGNIFICANT CHANGE UP (ref 80–100)
MCV RBC AUTO: 98.1 FL — SIGNIFICANT CHANGE UP (ref 80–100)
NRBC # BLD: 0 /100 WBCS — SIGNIFICANT CHANGE UP (ref 0–0)
NRBC # BLD: 0 /100 WBCS — SIGNIFICANT CHANGE UP (ref 0–0)
PHOSPHATE SERPL-MCNC: 2.1 MG/DL — LOW (ref 2.5–4.5)
PHOSPHATE SERPL-MCNC: 2.1 MG/DL — LOW (ref 2.5–4.5)
PLATELET # BLD AUTO: 210 K/UL — SIGNIFICANT CHANGE UP (ref 150–400)
PLATELET # BLD AUTO: 210 K/UL — SIGNIFICANT CHANGE UP (ref 150–400)
POTASSIUM SERPL-MCNC: 3.4 MMOL/L — LOW (ref 3.5–5.3)
POTASSIUM SERPL-MCNC: 3.4 MMOL/L — LOW (ref 3.5–5.3)
POTASSIUM SERPL-SCNC: 3.4 MMOL/L — LOW (ref 3.5–5.3)
POTASSIUM SERPL-SCNC: 3.4 MMOL/L — LOW (ref 3.5–5.3)
RBC # BLD: 3.62 M/UL — LOW (ref 4.2–5.8)
RBC # BLD: 3.62 M/UL — LOW (ref 4.2–5.8)
RBC # FLD: 14.6 % — HIGH (ref 10.3–14.5)
RBC # FLD: 14.6 % — HIGH (ref 10.3–14.5)
SODIUM SERPL-SCNC: 135 MMOL/L — SIGNIFICANT CHANGE UP (ref 135–145)
SODIUM SERPL-SCNC: 135 MMOL/L — SIGNIFICANT CHANGE UP (ref 135–145)
TRIGL SERPL-MCNC: 671 MG/DL — HIGH
TRIGL SERPL-MCNC: 671 MG/DL — HIGH
WBC # BLD: 11.38 K/UL — HIGH (ref 3.8–10.5)
WBC # BLD: 11.38 K/UL — HIGH (ref 3.8–10.5)
WBC # FLD AUTO: 11.38 K/UL — HIGH (ref 3.8–10.5)
WBC # FLD AUTO: 11.38 K/UL — HIGH (ref 3.8–10.5)

## 2023-12-18 PROCEDURE — 99232 SBSQ HOSP IP/OBS MODERATE 35: CPT

## 2023-12-18 PROCEDURE — 99232 SBSQ HOSP IP/OBS MODERATE 35: CPT | Mod: GC

## 2023-12-18 RX ORDER — SODIUM CHLORIDE 9 MG/ML
1000 INJECTION, SOLUTION INTRAVENOUS
Refills: 0 | Status: DISCONTINUED | OUTPATIENT
Start: 2023-12-18 | End: 2023-12-19

## 2023-12-18 RX ORDER — FENOFIBRATE,MICRONIZED 130 MG
145 CAPSULE ORAL DAILY
Refills: 0 | Status: DISCONTINUED | OUTPATIENT
Start: 2023-12-18 | End: 2023-12-19

## 2023-12-18 RX ORDER — DEXTROSE 50 % IN WATER 50 %
12.5 SYRINGE (ML) INTRAVENOUS ONCE
Refills: 0 | Status: DISCONTINUED | OUTPATIENT
Start: 2023-12-18 | End: 2023-12-19

## 2023-12-18 RX ORDER — DEXTROSE 50 % IN WATER 50 %
15 SYRINGE (ML) INTRAVENOUS ONCE
Refills: 0 | Status: DISCONTINUED | OUTPATIENT
Start: 2023-12-18 | End: 2023-12-19

## 2023-12-18 RX ORDER — MORPHINE SULFATE 50 MG/1
2 CAPSULE, EXTENDED RELEASE ORAL EVERY 6 HOURS
Refills: 0 | Status: DISCONTINUED | OUTPATIENT
Start: 2023-12-18 | End: 2023-12-19

## 2023-12-18 RX ORDER — OXYCODONE HYDROCHLORIDE 5 MG/1
5 TABLET ORAL EVERY 4 HOURS
Refills: 0 | Status: DISCONTINUED | OUTPATIENT
Start: 2023-12-18 | End: 2023-12-19

## 2023-12-18 RX ORDER — SODIUM,POTASSIUM PHOSPHATES 278-250MG
1 POWDER IN PACKET (EA) ORAL ONCE
Refills: 0 | Status: COMPLETED | OUTPATIENT
Start: 2023-12-18 | End: 2023-12-18

## 2023-12-18 RX ORDER — DEXTROSE 50 % IN WATER 50 %
25 SYRINGE (ML) INTRAVENOUS ONCE
Refills: 0 | Status: DISCONTINUED | OUTPATIENT
Start: 2023-12-18 | End: 2023-12-19

## 2023-12-18 RX ORDER — FENOFIBRATE,MICRONIZED 130 MG
145 CAPSULE ORAL EVERY 24 HOURS
Refills: 0 | Status: DISCONTINUED | OUTPATIENT
Start: 2023-12-18 | End: 2023-12-19

## 2023-12-18 RX ORDER — INSULIN LISPRO 100/ML
VIAL (ML) SUBCUTANEOUS
Refills: 0 | Status: DISCONTINUED | OUTPATIENT
Start: 2023-12-18 | End: 2023-12-19

## 2023-12-18 RX ORDER — POTASSIUM CHLORIDE 20 MEQ
40 PACKET (EA) ORAL EVERY 4 HOURS
Refills: 0 | Status: COMPLETED | OUTPATIENT
Start: 2023-12-18 | End: 2023-12-18

## 2023-12-18 RX ORDER — GLUCAGON INJECTION, SOLUTION 0.5 MG/.1ML
1 INJECTION, SOLUTION SUBCUTANEOUS ONCE
Refills: 0 | Status: DISCONTINUED | OUTPATIENT
Start: 2023-12-18 | End: 2023-12-19

## 2023-12-18 RX ADMIN — Medication 40 MILLIEQUIVALENT(S): at 17:13

## 2023-12-18 RX ADMIN — LOSARTAN POTASSIUM 50 MILLIGRAM(S): 100 TABLET, FILM COATED ORAL at 07:24

## 2023-12-18 RX ADMIN — Medication 650 MILLIGRAM(S): at 04:52

## 2023-12-18 RX ADMIN — Medication 40 MILLIEQUIVALENT(S): at 21:29

## 2023-12-18 RX ADMIN — OXYCODONE HYDROCHLORIDE 5 MILLIGRAM(S): 5 TABLET ORAL at 18:44

## 2023-12-18 RX ADMIN — MORPHINE SULFATE 4 MILLIGRAM(S): 50 CAPSULE, EXTENDED RELEASE ORAL at 01:47

## 2023-12-18 RX ADMIN — Medication 1 PACKET(S): at 17:13

## 2023-12-18 RX ADMIN — MORPHINE SULFATE 4 MILLIGRAM(S): 50 CAPSULE, EXTENDED RELEASE ORAL at 02:02

## 2023-12-18 RX ADMIN — MORPHINE SULFATE 4 MILLIGRAM(S): 50 CAPSULE, EXTENDED RELEASE ORAL at 09:49

## 2023-12-18 RX ADMIN — OXYCODONE HYDROCHLORIDE 5 MILLIGRAM(S): 5 TABLET ORAL at 14:12

## 2023-12-18 RX ADMIN — ATORVASTATIN CALCIUM 20 MILLIGRAM(S): 80 TABLET, FILM COATED ORAL at 21:30

## 2023-12-18 RX ADMIN — Medication 150 MICROGRAM(S): at 06:31

## 2023-12-18 RX ADMIN — Medication 145 MILLIGRAM(S): at 13:12

## 2023-12-18 RX ADMIN — Medication 145 MILLIGRAM(S): at 21:29

## 2023-12-18 RX ADMIN — ENOXAPARIN SODIUM 40 MILLIGRAM(S): 100 INJECTION SUBCUTANEOUS at 09:19

## 2023-12-18 RX ADMIN — MORPHINE SULFATE 4 MILLIGRAM(S): 50 CAPSULE, EXTENDED RELEASE ORAL at 09:19

## 2023-12-18 RX ADMIN — Medication 650 MILLIGRAM(S): at 14:54

## 2023-12-18 RX ADMIN — OXYCODONE HYDROCHLORIDE 5 MILLIGRAM(S): 5 TABLET ORAL at 13:12

## 2023-12-18 NOTE — PROGRESS NOTE ADULT - PROBLEM SELECTOR PLAN 8
PMHx HTN, follows regularly w PCP (Dr. La?), home medications include propanolol 20mg qhs, losartan (unsure of dose)  - follow up formal med rec in am  - resume home medications pending dose confirmation PMHx HTN, follows regularly w PCP (Dr. La?), home medications include propanolol 20mg qhs, losartan (unsure of dose)  - Continue Losartan 50mg QD

## 2023-12-18 NOTE — PROGRESS NOTE ADULT - PROBLEM SELECTOR PLAN 11
POA, HR > 90, WBC > 12  In setting of pancreatitis Now resolving.  Initially:  POA, HR > 90, WBC > 12  In setting of pancreatitis

## 2023-12-18 NOTE — PROGRESS NOTE ADULT - PROBLEM SELECTOR PLAN 9
PMHx HLD, follows regularly w PCP, takes rosuvastatin at home  - follow up formal med rec in am  - hold rosuvastatin until cmp results, otherwise resume home medications pending dose confirmation PMHx HLD, follows regularly w PCP, takes rosuvastatin at home  - continue Atorvastatin 20mg QD  - initiate Fenofibrate as above

## 2023-12-18 NOTE — PROGRESS NOTE ADULT - PROBLEM SELECTOR PLAN 2
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    -f/u Urine lytes  -Continue to monitor CBC

## 2023-12-18 NOTE — PROGRESS NOTE ADULT - PROBLEM SELECTOR PLAN 1
-Pt p/w epigastric pain radiating to back, attributes to past episodes of pancreatitis, Lipase >375.   -Likely 2/2 chronic etoh consumption (reports 4 glasses of wine 5 days/week) and hypertriglyceridemia given Triglycerides= 1660, TG now decreased to 660  -Tien's criteria: 0  -CT A/P: Acute pancreatitis. Possible small early necrotic change at the junction of body and tail.    PLAN:  - Continue 1L NS 100cc/hr x10 hrs  - Pain regimen:     ~Tylenol 650mg Q6     ~IV Morphine 4mg Q4 PRN  - General surgery consulted re: necrotic changes and decreased bowel motility, appreciate recs  - monitor Hct (goal 35-44%)  -monitor BUN (increase IVF if BUN remains the same or increases)  - clear liquid diet, advance as tolerated  -Endocrine consult -Pt p/w epigastric pain radiating to back, attributes to past episodes of pancreatitis, Lipase >375.   -Likely 2/2 chronic etoh consumption (reports 4 glasses of wine 5 days/week) and hypertriglyceridemia given Triglycerides= 1660, TG now decreased to 660  -Tien's criteria: 0  -CT A/P: Acute pancreatitis. Possible small early necrotic change at the junction of body and tail.    PLAN:  - Continue 1L NS 100cc/hr x18 hrs  - Pain regimen:     ~Tylenol 650mg Q6     ~Switch from IV Morphine 4mg Q4 PRN to PO Oxycodone 5mg Q4 PRN      ~IV Morphine 2mg Q6 for breakthrough pain  - General surgery consulted re: necrotic changes and decreased bowel motility, per recs;     ~no surgical intervention at this time.     ~monitor strict I&Os.  - monitor Hct (goal 35-44%)  - monitor BUN (increase IVF if BUN remains the same or increases)  - Advance to Regular diet today  - Endocrine consult, per recs start Fenofibrate 145mg QD

## 2023-12-18 NOTE — PROGRESS NOTE ADULT - SUBJECTIVE AND OBJECTIVE BOX
Patient is a 48y old  Male who presents with a chief complaint of pancreatitis (18 Dec 2023 08:12)      INTERVAL HPI/OVERNIGHT EVENTS:        Vital Signs Last 24 Hrs  T(C): 37.6 (18 Dec 2023 05:22), Max: 37.6 (18 Dec 2023 05:22)  T(F): 99.7 (18 Dec 2023 05:22), Max: 99.7 (18 Dec 2023 05:22)  HR: 89 (18 Dec 2023 07:20) (85 - 107)  BP: 144/91 (18 Dec 2023 07:20) (121/70 - 150/90)  BP(mean): 87 (17 Dec 2023 15:22) (87 - 87)  RR: 20 (18 Dec 2023 05:22) (18 - 20)  SpO2: 93% (18 Dec 2023 05:22) (93% - 93%)    Parameters below as of 17 Dec 2023 21:05  Patient On (Oxygen Delivery Method): room air          PHYSICAL EXAM:  GENERAL: NAD, well-groomed, well-developed  HEAD:  Atraumatic, Normocephalic  EYES: EOMI, PERRLA, conjunctiva and sclera clear  ENMT: No tonsillar erythema, exudates, or enlargement; Moist mucous membranes, Good dentition, No lesions  NECK: Supple, No JVD, Normal thyroid  NERVOUS SYSTEM:  Alert & Oriented X3, Good concentration; Motor Strength 5/5 B/L upper and lower extremities; DTRs 2+ intact and symmetric  CHEST/LUNG: Clear to percussion bilaterally; No rales, rhonchi, wheezing, or rubs  HEART: Regular rate and rhythm; No murmurs, rubs, or gallops  ABDOMEN: Soft, Nontender, Nondistended; Bowel sounds present  EXTREMITIES:  2+ Peripheral Pulses, No clubbing, cyanosis, or edema  LYMPH: No lymphadenopathy noted  SKIN: No rashes or lesions    Consultant(s) Notes Reviewed:  [x ] YES  [ ] NO  Care Discussed with Consultants/Other Providers [ x] YES  [ ] NO    LABS:        RADIOLOGY & ADDITIONAL TESTS:    Imaging Personally Reviewed:  [ ] YES  [ ] NO  acetaminophen     Tablet .. 650 milliGRAM(s) Oral every 6 hours  atorvastatin 20 milliGRAM(s) Oral at bedtime  enoxaparin Injectable 40 milliGRAM(s) SubCutaneous every 24 hours  influenza   Vaccine 0.5 milliLiter(s) IntraMuscular once  lactated ringers. 1000 milliLiter(s) IV Continuous <Continuous>  levothyroxine 150 MICROGram(s) Oral daily  losartan 50 milliGRAM(s) Oral with breakfast  melatonin 3 milliGRAM(s) Oral at bedtime PRN  morphine  - Injectable 4 milliGRAM(s) IV Push every 4 hours PRN  polyethylene glycol 3350 17 Gram(s) Oral every 12 hours  senna 2 Tablet(s) Oral at bedtime      HEALTH ISSUES - PROBLEM Dx:  Acute pancreatitis    Hyponatremia    Hypocalcemia    Leukocytosis    Fatty liver    Dysfunctional alcohol use    Hypothyroidism    HTN (hypertension)    HLD (hyperlipidemia)    Anxiety    SIRS without infection or organ dysfunction           Patient is a 48y old  Male who presents with a chief complaint of pancreatitis (18 Dec 2023 08:12)      INTERVAL HPI/OVERNIGHT EVENTS: GARETT    SUBJECTIVE: Patient seen and examined at bedside. He is sitting in chair, appears to be in discomfort 2/2 pain. He notes that the Tylenol and Morphine are helping to control his pain, but it worsens in between medications. He rates current pain as 4-6/10, which he states is in his abdomen and lower back. He is passing gas, and had 1 episode of diarrhea this morning.    Vital Signs Last 24 Hrs  T(C): 37.6 (18 Dec 2023 05:22), Max: 37.6 (18 Dec 2023 05:22)  T(F): 99.7 (18 Dec 2023 05:22), Max: 99.7 (18 Dec 2023 05:22)  HR: 89 (18 Dec 2023 07:20) (85 - 107)  BP: 144/91 (18 Dec 2023 07:20) (121/70 - 150/90)  BP(mean): 87 (17 Dec 2023 15:22) (87 - 87)  RR: 20 (18 Dec 2023 05:22) (18 - 20)  SpO2: 93% (18 Dec 2023 05:22) (93% - 93%)    Parameters below as of 17 Dec 2023 21:05  Patient On (Oxygen Delivery Method): room air        PHYSICAL EXAM:  GENERAL: +Appears in discomfort/distress 2/2 pain, well-groomed, well-developed, sitting in chair beside bed.  HEAD:  Atraumatic, Normocephalic  EYES: conjunctiva and sclera clear  ENMT: Moist mucous membranes  NECK: Supple  NERVOUS SYSTEM:  Alert & Oriented X3, Good concentration  CHEST/LUNG: Clear to percussion bilaterally; No rales, rhonchi, wheezing, or rubs  HEART: Regular rate and rhythm; No murmurs, rubs, or gallops  ABDOMEN: Soft, Nontender, +Distended; Bowel sounds present  EXTREMITIES:  2+ Peripheral Pulses, No clubbing, cyanosis, or edema  LYMPH: No lymphadenopathy noted  SKIN: No rashes or lesions    Consultant(s) Notes Reviewed:  [x ] YES  [ ] NO  Care Discussed with Consultants/Other Providers [ x] YES  [ ] NO    LABS:        RADIOLOGY & ADDITIONAL TESTS:    Imaging Personally Reviewed:  [ ] YES  [ ] NO  acetaminophen     Tablet .. 650 milliGRAM(s) Oral every 6 hours  atorvastatin 20 milliGRAM(s) Oral at bedtime  enoxaparin Injectable 40 milliGRAM(s) SubCutaneous every 24 hours  influenza   Vaccine 0.5 milliLiter(s) IntraMuscular once  lactated ringers. 1000 milliLiter(s) IV Continuous <Continuous>  levothyroxine 150 MICROGram(s) Oral daily  losartan 50 milliGRAM(s) Oral with breakfast  melatonin 3 milliGRAM(s) Oral at bedtime PRN  morphine  - Injectable 4 milliGRAM(s) IV Push every 4 hours PRN  polyethylene glycol 3350 17 Gram(s) Oral every 12 hours  senna 2 Tablet(s) Oral at bedtime      HEALTH ISSUES - PROBLEM Dx:  Acute pancreatitis    Hyponatremia    Hypocalcemia    Leukocytosis    Fatty liver    Dysfunctional alcohol use    Hypothyroidism    HTN (hypertension)    HLD (hyperlipidemia)    Anxiety    SIRS without infection or organ dysfunction

## 2023-12-18 NOTE — PROGRESS NOTE ADULT - SUBJECTIVE AND OBJECTIVE BOX
SUBJECTIVE / INTERVAL HPI: Patient was seen and examined this morning.     Overnight events:        HPI    Mr. Montgomery is a 48 year-old man with a history of alcohol-induced pancreatitis and thyroid cancer admitted with alcohol-induced pancreatitis. We were consulted for assistance in management of hypertriglyceridemia.    He takes rosuvastatin for hyperlipidemia as an outpatient; unknown dose. Triglycerides initially 1660 mg/dL and subsequently 660 mg/dL. Glucose values 128-177 mg/dL.     He was diagnosed with thyroid cancer in 2015 and is status post total thyroidectomy. He follows with Dr. Tricia Noonan every 6 months. He takes Synthroid brand 150 mcg, 1 pill 6 days/week and 1.5 pills 1 day/week (average daily dose 161 mcg). TSH 3.20 uIU/mL during hospital admission.       REVIEW OF SYSTEMS  Constitutional:  Negative fever, chills or loss of appetite.  Eyes:  Negative blurry vision or double vision.  Cardiovascular:  Negative for chest pain or palpitations.  Respiratory:  Negative for cough, wheezing, or shortness of breath.    Gastrointestinal:  Negative for nausea, vomiting, diarrhea, constipation, or abdominal pain.  Genitourinary:  Negative frequency, urgency or dysuria.  Neurologic:  No headache, confusion, dizziness, lightheadedness.    PHYSICAL EXAM  Vital Signs Last 24 Hrs  T(C): 37.6 (18 Dec 2023 05:22), Max: 37.6 (18 Dec 2023 05:22)  T(F): 99.7 (18 Dec 2023 05:22), Max: 99.7 (18 Dec 2023 05:22)  HR: 89 (18 Dec 2023 07:20) (85 - 107)  BP: 144/91 (18 Dec 2023 07:20) (121/70 - 150/90)  BP(mean): 87 (17 Dec 2023 15:22) (87 - 87)  RR: 20 (18 Dec 2023 05:22) (18 - 20)  SpO2: 93% (18 Dec 2023 05:22) (93% - 93%)    Parameters below as of 17 Dec 2023 21:05  Patient On (Oxygen Delivery Method): room air        Constitutional: Awake, alert, in no acute distress.   HEENT: Normocephalic, atraumatic, MICHELLE.  Respiratory: Lungs clear to ausculation bilaterally.   Cardiovascular: regular rhythm, normal S1 and S2, no audible murmurs.   GI: soft, non-tender, non-distended, bowel sounds present.  Extremities: No lower extremity edema.  Psychiatric: AAO x 3. Normal affect/mood.     LABS  CBC - WBC/HGB/HTC/PLT: 11.38/11.2/35.5/210 (12-18-23)  BMP - Na/K/Cl/Bicarb/BUN/Cr/Gluc/AG/eGFR: 135/3.4/99/25/8/0.78/124/11/109 (12-18-23)  Ca - 8.3 (12-18-23)  Phos - 2.1 (12-18-23)  Mg - 2.5 (12-18-23)  LFT - Alb/Tprot/Tbili/Dbili/AlkPhos/ALT/AST: 3.4/--/2.8/--/60/28/42 (12-17-23)  PT/aPTT/INR: 12.4/--/1.09 (12-17-23)   Thyroid Stimulating Hormone, Serum: 3.020 (12-16-23)          MEDICATIONS  MEDICATIONS  (STANDING):  acetaminophen     Tablet .. 650 milliGRAM(s) Oral every 6 hours  atorvastatin 20 milliGRAM(s) Oral at bedtime  enoxaparin Injectable 40 milliGRAM(s) SubCutaneous every 24 hours  influenza   Vaccine 0.5 milliLiter(s) IntraMuscular once  lactated ringers. 1000 milliLiter(s) (100 mL/Hr) IV Continuous <Continuous>  levothyroxine 150 MICROGram(s) Oral daily  losartan 50 milliGRAM(s) Oral with breakfast  polyethylene glycol 3350 17 Gram(s) Oral every 12 hours  senna 2 Tablet(s) Oral at bedtime    MEDICATIONS  (PRN):  melatonin 3 milliGRAM(s) Oral at bedtime PRN Insomnia  morphine  - Injectable 4 milliGRAM(s) IV Push every 4 hours PRN Severe Pain (7 - 10)    ASSESSMENT / RECOMMENDATIONS    A1C: 5.9 %  BUN: 8  Creatinine: 0.78  GFR: 109  Weight: 102.1  BMI: 28.9    # hypertriglyceridemia  - 1660 --> 660 --> 671    # hypocalcemia    # Hx of thyroid cancer  # Hypothyroidism    # Pre-diabetes  - a1c 5.9      Case discussed with Dr. Figueroa. Primary team updated.       Elina David  Endocrinology Fellow    Service Pager: [phone]    SUBJECTIVE / INTERVAL HPI: Patient was seen and examined this morning.     Overnight events:        HPI    Mr. Montgomery is a 48 year-old man with a history of alcohol-induced pancreatitis and thyroid cancer admitted with alcohol-induced pancreatitis. We were consulted for assistance in management of hypertriglyceridemia.    He takes rosuvastatin for hyperlipidemia as an outpatient; unknown dose. Triglycerides initially 1660 mg/dL and subsequently 660 mg/dL. Glucose values 128-177 mg/dL.     He was diagnosed with thyroid cancer in 2015 and is status post total thyroidectomy. He follows with Dr. Tricia Noonan every 6 months. He takes Synthroid brand 150 mcg, 1 pill 6 days/week and 1.5 pills 1 day/week (average daily dose 161 mcg). TSH 3.20 uIU/mL during hospital admission.       REVIEW OF SYSTEMS  Constitutional:  Negative fever, chills or loss of appetite.  Eyes:  Negative blurry vision or double vision.  Cardiovascular:  Negative for chest pain or palpitations.  Respiratory:  Negative for cough, wheezing, or shortness of breath.    Gastrointestinal:  Negative for nausea, vomiting, diarrhea, constipation, or abdominal pain.  Genitourinary:  Negative frequency, urgency or dysuria.  Neurologic:  No headache, confusion, dizziness, lightheadedness.    PHYSICAL EXAM  Vital Signs Last 24 Hrs  T(C): 37.6 (18 Dec 2023 05:22), Max: 37.6 (18 Dec 2023 05:22)  T(F): 99.7 (18 Dec 2023 05:22), Max: 99.7 (18 Dec 2023 05:22)  HR: 89 (18 Dec 2023 07:20) (85 - 107)  BP: 144/91 (18 Dec 2023 07:20) (121/70 - 150/90)  BP(mean): 87 (17 Dec 2023 15:22) (87 - 87)  RR: 20 (18 Dec 2023 05:22) (18 - 20)  SpO2: 93% (18 Dec 2023 05:22) (93% - 93%)    Parameters below as of 17 Dec 2023 21:05  Patient On (Oxygen Delivery Method): room air        Constitutional: Awake, alert, in no acute distress.   HEENT: Normocephalic, atraumatic, MICHELLE.  Respiratory: Lungs clear to ausculation bilaterally.   Cardiovascular: regular rhythm, normal S1 and S2, no audible murmurs.   GI: soft, non-tender, non-distended, bowel sounds present.  Extremities: No lower extremity edema.  Psychiatric: AAO x 3. Normal affect/mood.     LABS  CBC - WBC/HGB/HTC/PLT: 11.38/11.2/35.5/210 (12-18-23)  BMP - Na/K/Cl/Bicarb/BUN/Cr/Gluc/AG/eGFR: 135/3.4/99/25/8/0.78/124/11/109 (12-18-23)  Ca - 8.3 (12-18-23)  Phos - 2.1 (12-18-23)  Mg - 2.5 (12-18-23)  LFT - Alb/Tprot/Tbili/Dbili/AlkPhos/ALT/AST: 3.4/--/2.8/--/60/28/42 (12-17-23)  PT/aPTT/INR: 12.4/--/1.09 (12-17-23)   Thyroid Stimulating Hormone, Serum: 3.020 (12-16-23)          MEDICATIONS  MEDICATIONS  (STANDING):  acetaminophen     Tablet .. 650 milliGRAM(s) Oral every 6 hours  atorvastatin 20 milliGRAM(s) Oral at bedtime  enoxaparin Injectable 40 milliGRAM(s) SubCutaneous every 24 hours  influenza   Vaccine 0.5 milliLiter(s) IntraMuscular once  lactated ringers. 1000 milliLiter(s) (100 mL/Hr) IV Continuous <Continuous>  levothyroxine 150 MICROGram(s) Oral daily  losartan 50 milliGRAM(s) Oral with breakfast  polyethylene glycol 3350 17 Gram(s) Oral every 12 hours  senna 2 Tablet(s) Oral at bedtime    MEDICATIONS  (PRN):  melatonin 3 milliGRAM(s) Oral at bedtime PRN Insomnia  morphine  - Injectable 4 milliGRAM(s) IV Push every 4 hours PRN Severe Pain (7 - 10)    ASSESSMENT / RECOMMENDATIONS    A1C: 5.9 %  BUN: 8  Creatinine: 0.78  GFR: 109  Weight: 102.1  BMI: 28.9    # hypertriglyceridemia  - 1660 --> 660 --> 671    # hypocalcemia    # Hx of thyroid cancer  # Hypothyroidism    # Pre-diabetes  - a1c 5.9      Case discussed with Dr. Figueroa. Primary team updated.       Elina David  Endocrinology Fellow    Service Pager: [phone]    SUBJECTIVE / INTERVAL HPI: Patient was seen and examined this morning.     Overnight events:  Currently on clear liquid diet      HPI    Mr. Montgomery is a 48 year-old man with hx of ETOH pancreatitis, hypothyroidism, HTN, HLD and anxiety presented with epigastric pain to [facility] on 12/15. Pt was found to have acute pancreatitis.  Triglycerideries were measured 1660, endocrinology was consulted for hypertriglyceridemia    He was diagnosed with thyroid cancer in 2015 and is status post total thyroidectomy. He follows with Dr. Tricia Noonan every 6 months. He takes Synthroid brand 150 mcg, 1 pill 6 days/week and 1.5 pills 1 day/week (average daily dose 161 mcg). TSH 3.20 uIU/mL during hospital admission.       REVIEW OF SYSTEMS  Constitutional:  Negative fever, chills or loss of appetite.  Eyes:  Negative blurry vision or double vision.  Cardiovascular:  Negative for chest pain or palpitations.  Respiratory:  Negative for cough, wheezing, or shortness of breath.    Gastrointestinal:  Negative for nausea, vomiting, diarrhea, constipation, or abdominal pain.  Genitourinary:  Negative frequency, urgency or dysuria.  Neurologic:  No headache, confusion, dizziness, lightheadedness.    PHYSICAL EXAM  Vital Signs Last 24 Hrs  T(C): 37.6 (18 Dec 2023 05:22), Max: 37.6 (18 Dec 2023 05:22)  T(F): 99.7 (18 Dec 2023 05:22), Max: 99.7 (18 Dec 2023 05:22)  HR: 89 (18 Dec 2023 07:20) (85 - 107)  BP: 144/91 (18 Dec 2023 07:20) (121/70 - 150/90)  BP(mean): 87 (17 Dec 2023 15:22) (87 - 87)  RR: 20 (18 Dec 2023 05:22) (18 - 20)  SpO2: 93% (18 Dec 2023 05:22) (93% - 93%)    Parameters below as of 17 Dec 2023 21:05  Patient On (Oxygen Delivery Method): room air        Constitutional: Awake, alert, in no acute distress.   HEENT: Normocephalic, atraumatic, MICHELLE.  Respiratory: Lungs clear to ausculation bilaterally.   Cardiovascular: regular rhythm, normal S1 and S2, no audible murmurs.   GI: soft, non-tender, non-distended, bowel sounds present.  Extremities: No lower extremity edema.  Psychiatric: AAO x 3. Normal affect/mood.     LABS  CBC - WBC/HGB/HTC/PLT: 11.38/11.2/35.5/210 (12-18-23)  BMP - Na/K/Cl/Bicarb/BUN/Cr/Gluc/AG/eGFR: 135/3.4/99/25/8/0.78/124/11/109 (12-18-23)  Ca - 8.3 (12-18-23)  Phos - 2.1 (12-18-23)  Mg - 2.5 (12-18-23)  LFT - Alb/Tprot/Tbili/Dbili/AlkPhos/ALT/AST: 3.4/--/2.8/--/60/28/42 (12-17-23)  PT/aPTT/INR: 12.4/--/1.09 (12-17-23)   Thyroid Stimulating Hormone, Serum: 3.020 (12-16-23)          MEDICATIONS  MEDICATIONS  (STANDING):  acetaminophen     Tablet .. 650 milliGRAM(s) Oral every 6 hours  atorvastatin 20 milliGRAM(s) Oral at bedtime  enoxaparin Injectable 40 milliGRAM(s) SubCutaneous every 24 hours  influenza   Vaccine 0.5 milliLiter(s) IntraMuscular once  lactated ringers. 1000 milliLiter(s) (100 mL/Hr) IV Continuous <Continuous>  levothyroxine 150 MICROGram(s) Oral daily  losartan 50 milliGRAM(s) Oral with breakfast  polyethylene glycol 3350 17 Gram(s) Oral every 12 hours  senna 2 Tablet(s) Oral at bedtime    MEDICATIONS  (PRN):  melatonin 3 milliGRAM(s) Oral at bedtime PRN Insomnia  morphine  - Injectable 4 milliGRAM(s) IV Push every 4 hours PRN Severe Pain (7 - 10)    ASSESSMENT / RECOMMENDATIONS    A1C: 5.9 %  BUN: 8  Creatinine: 0.78  GFR: 109  Weight: 102.1  BMI: 28.9    # hypertriglyceridemia  - 1660 --> 660 --> 671  - consider starting fenofibrate    # hypocalcemia  Calcium/Albumin  8.1 mg/dL (12-17 @ 05:30)  3.4 g/dL (12-17 @ 05:30)  8.5 mg/dL (12-17 @ 20:00)  8.3 mg/dL (12-18 @ 05:30)    # Hx of thyroid cancer  # Hypothyroidism  - TSH 3.02  - check free T 4  - c/w levothyroxine 150mcg    # Pre-diabetes  - a1c 5.9  - carbohydrate controlled diet  - MISS meals and nightly      Case discussed with Dr. Figueroa. Primary team updated.       Elina Hernandez  Endocrinology Fellow    Service Pager: [phone]    SUBJECTIVE / INTERVAL HPI: Patient was seen and examined this morning.     Overnight events:  Currently on clear liquid diet      HPI    Mr. Montgomery is a 48 year-old man with hx of ETOH pancreatitis, hypothyroidism, HTN, HLD and anxiety presented with epigastric pain to [facility] on 12/15. Pt was found to have acute pancreatitis.  Triglycerideries were measured 1660, endocrinology was consulted for hypertriglyceridemia    He was diagnosed with thyroid cancer in 2015 and is status post total thyroidectomy. He follows with Dr. Tricia Noonan every 6 months. He takes Synthroid brand 150 mcg, 1 pill 6 days/week and 1.5 pills 1 day/week (average daily dose 161 mcg). TSH 3.20 uIU/mL during hospital admission.       REVIEW OF SYSTEMS  Constitutional:  Negative fever, chills or loss of appetite.  Eyes:  Negative blurry vision or double vision.  Cardiovascular:  Negative for chest pain or palpitations.  Respiratory:  Negative for cough, wheezing, or shortness of breath.    Gastrointestinal:  Negative for nausea, vomiting, diarrhea, constipation, or abdominal pain.  Genitourinary:  Negative frequency, urgency or dysuria.  Neurologic:  No headache, confusion, dizziness, lightheadedness.    PHYSICAL EXAM  Vital Signs Last 24 Hrs  T(C): 37.6 (18 Dec 2023 05:22), Max: 37.6 (18 Dec 2023 05:22)  T(F): 99.7 (18 Dec 2023 05:22), Max: 99.7 (18 Dec 2023 05:22)  HR: 89 (18 Dec 2023 07:20) (85 - 107)  BP: 144/91 (18 Dec 2023 07:20) (121/70 - 150/90)  BP(mean): 87 (17 Dec 2023 15:22) (87 - 87)  RR: 20 (18 Dec 2023 05:22) (18 - 20)  SpO2: 93% (18 Dec 2023 05:22) (93% - 93%)    Parameters below as of 17 Dec 2023 21:05  Patient On (Oxygen Delivery Method): room air        Constitutional: Awake, alert, in no acute distress.   HEENT: Normocephalic, atraumatic, MICHELLE.  Respiratory: Lungs clear to ausculation bilaterally.   Cardiovascular: regular rhythm, normal S1 and S2, no audible murmurs.   GI: soft, non-tender, non-distended, bowel sounds present.  Extremities: No lower extremity edema.  Psychiatric: AAO x 3. Normal affect/mood.     LABS  CBC - WBC/HGB/HTC/PLT: 11.38/11.2/35.5/210 (12-18-23)  BMP - Na/K/Cl/Bicarb/BUN/Cr/Gluc/AG/eGFR: 135/3.4/99/25/8/0.78/124/11/109 (12-18-23)  Ca - 8.3 (12-18-23)  Phos - 2.1 (12-18-23)  Mg - 2.5 (12-18-23)  LFT - Alb/Tprot/Tbili/Dbili/AlkPhos/ALT/AST: 3.4/--/2.8/--/60/28/42 (12-17-23)  PT/aPTT/INR: 12.4/--/1.09 (12-17-23)   Thyroid Stimulating Hormone, Serum: 3.020 (12-16-23)          MEDICATIONS  MEDICATIONS  (STANDING):  acetaminophen     Tablet .. 650 milliGRAM(s) Oral every 6 hours  atorvastatin 20 milliGRAM(s) Oral at bedtime  enoxaparin Injectable 40 milliGRAM(s) SubCutaneous every 24 hours  influenza   Vaccine 0.5 milliLiter(s) IntraMuscular once  lactated ringers. 1000 milliLiter(s) (100 mL/Hr) IV Continuous <Continuous>  levothyroxine 150 MICROGram(s) Oral daily  losartan 50 milliGRAM(s) Oral with breakfast  polyethylene glycol 3350 17 Gram(s) Oral every 12 hours  senna 2 Tablet(s) Oral at bedtime    MEDICATIONS  (PRN):  melatonin 3 milliGRAM(s) Oral at bedtime PRN Insomnia  morphine  - Injectable 4 milliGRAM(s) IV Push every 4 hours PRN Severe Pain (7 - 10)    ASSESSMENT / RECOMMENDATIONS    A1C: 5.9 %  BUN: 8  Creatinine: 0.78  GFR: 109  Weight: 102.1  BMI: 28.9    # hypertriglyceridemia  - 1660 --> 660 --> 671  - consider starting fenofibrate    # hypocalcemia  Calcium/Albumin  8.1 mg/dL (12-17 @ 05:30)  3.4 g/dL (12-17 @ 05:30)  8.5 mg/dL (12-17 @ 20:00)  8.3 mg/dL (12-18 @ 05:30)    # Hx of thyroid cancer  # Hypothyroidism  - TSH 3.02  - check free T 4  - c/w levothyroxine 150mcg    # Pre-diabetes  - a1c 5.9  - carbohydrate controlled diet  - MISS meals and nightly      Case discussed with Dr. Figueroa. Primary team updated.       Elina Hernandez  Endocrinology Fellow    Service Pager: [phone]    SUBJECTIVE / INTERVAL HPI: Patient was seen and examined this morning.     Overnight events:  Currently on clear liquid diet  pt states that he is experiencing moderate amount of pain  denies nausea and vomiting    HPI    Mr. Montgomery is a 48 year-old man with hx of ETOH pancreatitis, hypothyroidism, HTN, HLD and anxiety presented with epigastric pain to [facility] on 12/15. Pt was found to have acute pancreatitis.  Triglycerideries were measured 1660, endocrinology was consulted for hypertriglyceridemia.  pt states he has been on fibrate before a few weeks.  he states he is aware that his outpatient TAG were high, from what he recalls was around 400s.     He was diagnosed with thyroid cancer in 2015 and is status post total thyroidectomy. Pt does not know what type of cancer.  He follows with Dr. Tricia Noonan every 6 months. He takes Synthroid brand 150 mcg, 1 pill 6 days/week and 1.5 pills 1 day/week - 225mcg (average daily dose 161 mcg).      REVIEW OF SYSTEMS  Constitutional:  Negative fever, chills or loss of appetite.  Eyes:  Negative blurry vision or double vision.  Cardiovascular:  Negative for chest pain or palpitations.  Respiratory:  Negative for cough, wheezing, or shortness of breath.    Gastrointestinal:  Negative for nausea, vomiting, diarrhea, constipation, or abdominal pain.  Genitourinary:  Negative frequency, urgency or dysuria.  Neurologic:  No headache, confusion, dizziness, lightheadedness.    PHYSICAL EXAM  Vital Signs Last 24 Hrs  T(C): 37.6 (18 Dec 2023 05:22), Max: 37.6 (18 Dec 2023 05:22)  T(F): 99.7 (18 Dec 2023 05:22), Max: 99.7 (18 Dec 2023 05:22)  HR: 89 (18 Dec 2023 07:20) (85 - 107)  BP: 144/91 (18 Dec 2023 07:20) (121/70 - 150/90)  BP(mean): 87 (17 Dec 2023 15:22) (87 - 87)  RR: 20 (18 Dec 2023 05:22) (18 - 20)  SpO2: 93% (18 Dec 2023 05:22) (93% - 93%)    Parameters below as of 17 Dec 2023 21:05  Patient On (Oxygen Delivery Method): room air        Constitutional: Awake, alert, in no acute distress.   HEENT: Normocephalic, atraumatic, MICHELLE.  Respiratory: Lungs clear to ausculation bilaterally.   Cardiovascular: regular rhythm, normal S1 and S2, no audible murmurs.   GI: soft, non-tender, non-distended, bowel sounds present.  Extremities: No lower extremity edema.  Psychiatric: AAO x 3. Normal affect/mood.     LABS  CBC - WBC/HGB/HTC/PLT: 11.38/11.2/35.5/210 (12-18-23)  BMP - Na/K/Cl/Bicarb/BUN/Cr/Gluc/AG/eGFR: 135/3.4/99/25/8/0.78/124/11/109 (12-18-23)  Ca - 8.3 (12-18-23)  Phos - 2.1 (12-18-23)  Mg - 2.5 (12-18-23)  LFT - Alb/Tprot/Tbili/Dbili/AlkPhos/ALT/AST: 3.4/--/2.8/--/60/28/42 (12-17-23)  PT/aPTT/INR: 12.4/--/1.09 (12-17-23)   Thyroid Stimulating Hormone, Serum: 3.020 (12-16-23)          MEDICATIONS  MEDICATIONS  (STANDING):  acetaminophen     Tablet .. 650 milliGRAM(s) Oral every 6 hours  atorvastatin 20 milliGRAM(s) Oral at bedtime  enoxaparin Injectable 40 milliGRAM(s) SubCutaneous every 24 hours  influenza   Vaccine 0.5 milliLiter(s) IntraMuscular once  lactated ringers. 1000 milliLiter(s) (100 mL/Hr) IV Continuous <Continuous>  levothyroxine 150 MICROGram(s) Oral daily  losartan 50 milliGRAM(s) Oral with breakfast  polyethylene glycol 3350 17 Gram(s) Oral every 12 hours  senna 2 Tablet(s) Oral at bedtime    MEDICATIONS  (PRN):  melatonin 3 milliGRAM(s) Oral at bedtime PRN Insomnia  morphine  - Injectable 4 milliGRAM(s) IV Push every 4 hours PRN Severe Pain (7 - 10)    ASSESSMENT / RECOMMENDATIONS    Mr. Montgomery is a 48 year-old man with hx of ETOH pancreatitis, hypothyroidism, HTN, HLD and anxiety presented with epigastric pain to [facility] on 12/15. Pt was found to have acute pancreatitis.  Triglycerideries were measured 1660, endocrinology was consulted for hypertriglyceridemia.  pt states he has been on fibrate before a few weeks.  he states he is aware that his outpatient TAG were high, from what he recalls was around 400s.     He was diagnosed with thyroid cancer in 2015 and is status post total thyroidectomy. Pt does not know what type of cancer.  He follows with Dr. Tricia Noonan every 6 months. He takes Synthroid brand 150 mcg, 1 pill 6 days/week and 1.5 pills 1 day/week - 225mcg (average daily dose 161 mcg).    A1C: 5.9 %  BUN: 8  Creatinine: 0.78  GFR: 109  Weight: 102.1  BMI: 28.9    # hypertriglyceridemia  - 1660 --> 660 --> 671  - total cholesterol 346, HDL 37, Non   - 10 ASCVD risk 12.5%  - consider starting fenofibrate vs Vascepa (Isosapent ethyl)  - c/w statin    # hypocalcemia  - likely from pancreatitis  Calcium/Albumin  8.1 mg/dL (12-17 @ 05:30)  3.4 g/dL (12-17 @ 05:30)  8.5 mg/dL (12-17 @ 20:00)  8.3 mg/dL (12-18 @ 05:30)  - monitor>    # Hx of thyroid cancer  # Hypothyroidism  - TSH 3.02, check free T 4  - c/w levothyroxine 150mcg 6 times a week and 225mcg one time a week  - recheck TFTs outpatient    # Pre-diabetes  - a1c 5.9  - carbohydrate controlled diet  - MISS meals and nightly      Case discussed with Dr. Figueroa. Primary team updated.       Elina Hernandez  Endocrinology Fellow    Service Pager: [phone]    SUBJECTIVE / INTERVAL HPI: Patient was seen and examined this morning.     Overnight events:  Currently on clear liquid diet  pt states that he is experiencing moderate amount of pain  denies nausea and vomiting    HPI    Mr. Montgomery is a 48 year-old man with hx of ETOH pancreatitis, hypothyroidism, HTN, HLD and anxiety presented with epigastric pain to [facility] on 12/15. Pt was found to have acute pancreatitis.  Triglycerideries were measured 1660, endocrinology was consulted for hypertriglyceridemia.  pt states he has been on fibrate before a few weeks.  he states he is aware that his outpatient TAG were high, from what he recalls was around 400s.     He was diagnosed with thyroid cancer in 2015 and is status post total thyroidectomy. Pt does not know what type of cancer.  He follows with Dr. Tricia Noonan every 6 months. He takes Synthroid brand 150 mcg, 1 pill 6 days/week and 1.5 pills 1 day/week - 225mcg (average daily dose 161 mcg).      REVIEW OF SYSTEMS  Constitutional:  Negative fever, chills or loss of appetite.  Eyes:  Negative blurry vision or double vision.  Cardiovascular:  Negative for chest pain or palpitations.  Respiratory:  Negative for cough, wheezing, or shortness of breath.    Gastrointestinal:  Negative for nausea, vomiting, diarrhea, constipation, or abdominal pain.  Genitourinary:  Negative frequency, urgency or dysuria.  Neurologic:  No headache, confusion, dizziness, lightheadedness.    PHYSICAL EXAM  Vital Signs Last 24 Hrs  T(C): 37.6 (18 Dec 2023 05:22), Max: 37.6 (18 Dec 2023 05:22)  T(F): 99.7 (18 Dec 2023 05:22), Max: 99.7 (18 Dec 2023 05:22)  HR: 89 (18 Dec 2023 07:20) (85 - 107)  BP: 144/91 (18 Dec 2023 07:20) (121/70 - 150/90)  BP(mean): 87 (17 Dec 2023 15:22) (87 - 87)  RR: 20 (18 Dec 2023 05:22) (18 - 20)  SpO2: 93% (18 Dec 2023 05:22) (93% - 93%)    Parameters below as of 17 Dec 2023 21:05  Patient On (Oxygen Delivery Method): room air        Constitutional: Awake, alert, in no acute distress.   HEENT: Normocephalic, atraumatic, MICHELLE.  Respiratory: Lungs clear to ausculation bilaterally.   Cardiovascular: regular rhythm, normal S1 and S2, no audible murmurs.   GI: soft, non-tender, non-distended, bowel sounds present.  Extremities: No lower extremity edema.  Psychiatric: AAO x 3. Normal affect/mood.     LABS  CBC - WBC/HGB/HTC/PLT: 11.38/11.2/35.5/210 (12-18-23)  BMP - Na/K/Cl/Bicarb/BUN/Cr/Gluc/AG/eGFR: 135/3.4/99/25/8/0.78/124/11/109 (12-18-23)  Ca - 8.3 (12-18-23)  Phos - 2.1 (12-18-23)  Mg - 2.5 (12-18-23)  LFT - Alb/Tprot/Tbili/Dbili/AlkPhos/ALT/AST: 3.4/--/2.8/--/60/28/42 (12-17-23)  PT/aPTT/INR: 12.4/--/1.09 (12-17-23)   Thyroid Stimulating Hormone, Serum: 3.020 (12-16-23)          MEDICATIONS  MEDICATIONS  (STANDING):  acetaminophen     Tablet .. 650 milliGRAM(s) Oral every 6 hours  atorvastatin 20 milliGRAM(s) Oral at bedtime  enoxaparin Injectable 40 milliGRAM(s) SubCutaneous every 24 hours  influenza   Vaccine 0.5 milliLiter(s) IntraMuscular once  lactated ringers. 1000 milliLiter(s) (100 mL/Hr) IV Continuous <Continuous>  levothyroxine 150 MICROGram(s) Oral daily  losartan 50 milliGRAM(s) Oral with breakfast  polyethylene glycol 3350 17 Gram(s) Oral every 12 hours  senna 2 Tablet(s) Oral at bedtime    MEDICATIONS  (PRN):  melatonin 3 milliGRAM(s) Oral at bedtime PRN Insomnia  morphine  - Injectable 4 milliGRAM(s) IV Push every 4 hours PRN Severe Pain (7 - 10)    ASSESSMENT / RECOMMENDATIONS    Mr. Montgomery is a 48 year-old man with hx of ETOH pancreatitis, hypothyroidism, HTN, HLD and anxiety presented with epigastric pain to [facility] on 12/15. Pt was found to have acute pancreatitis.  Triglycerideries were measured 1660, endocrinology was consulted for hypertriglyceridemia.  pt states he has been on fibrate before a few weeks.  he states he is aware that his outpatient TAG were high, from what he recalls was around 400s.     He was diagnosed with thyroid cancer in 2015 and is status post total thyroidectomy. Pt does not know what type of cancer.  He follows with Dr. Tricia Noonan every 6 months. He takes Synthroid brand 150 mcg, 1 pill 6 days/week and 1.5 pills 1 day/week - 225mcg (average daily dose 161 mcg).    A1C: 5.9 %  BUN: 8  Creatinine: 0.78  GFR: 109  Weight: 102.1  BMI: 28.9    # hypertriglyceridemia  - 1660 --> 660 --> 671  - total cholesterol 346, HDL 37, Non   - 10 ASCVD risk 12.5%  - consider starting fenofibrate vs Vascepa (Isosapent ethyl)  - c/w statin    # hypocalcemia  - likely from pancreatitis  Calcium/Albumin  8.1 mg/dL (12-17 @ 05:30)  3.4 g/dL (12-17 @ 05:30)  8.5 mg/dL (12-17 @ 20:00)  8.3 mg/dL (12-18 @ 05:30)  - monitor>    # Hx of thyroid cancer  # Hypothyroidism  - TSH 3.02, check free T 4  - c/w levothyroxine 150mcg 6 times a week and 225mcg one time a week  - recheck TFTs outpatient    # Pre-diabetes  - a1c 5.9  - carbohydrate controlled diet  - MISS meals and nightly      Case discussed with Dr. Figueroa. Primary team updated.       Elina Hernandez  Endocrinology Fellow    Service Pager: [phone]    SUBJECTIVE / INTERVAL HPI: Patient was seen and examined this morning.     Overnight events:  Currently on clear liquid diet  pt states that he is experiencing moderate amount of pain  denies nausea and vomiting    HPI  Mr. Montgomery is a 48 year-old man with hx of ETOH pancreatitis, hypothyroidism, HTN, HLD and anxiety presented with epigastric pain to [facility] on 12/15. Pt was found to have acute pancreatitis.  Triglycerideries were measured 1660, endocrinology was consulted for hypertriglyceridemia.  pt states he has been on fibrate before a few weeks.  he states he is aware that his outpatient TAG were high, from what he recalls was around 400s.     He was diagnosed with thyroid cancer in 2015 and is status post total thyroidectomy. Pt does not know what type of cancer.  He follows with Dr. Tricia Noonan every 6 months. He takes Synthroid brand 150 mcg, 1 pill 6 days/week and 1.5 pills 1 day/week - 225mcg (average daily dose 161 mcg).    REVIEW OF SYSTEMS  Constitutional:  Negative fever, chills or loss of appetite.  Eyes:  Negative blurry vision or double vision.  Cardiovascular:  Negative for chest pain or palpitations.  Respiratory:  Negative for cough, wheezing, or shortness of breath.    Gastrointestinal:  Negative for nausea, vomiting, diarrhea, constipation, or abdominal pain.  Genitourinary:  Negative frequency, urgency or dysuria.  Neurologic:  No headache, confusion, dizziness, lightheadedness.    PHYSICAL EXAM  Vital Signs Last 24 Hrs  T(C): 37.6 (18 Dec 2023 05:22), Max: 37.6 (18 Dec 2023 05:22)  T(F): 99.7 (18 Dec 2023 05:22), Max: 99.7 (18 Dec 2023 05:22)  HR: 89 (18 Dec 2023 07:20) (85 - 107)  BP: 144/91 (18 Dec 2023 07:20) (121/70 - 150/90)  BP(mean): 87 (17 Dec 2023 15:22) (87 - 87)  RR: 20 (18 Dec 2023 05:22) (18 - 20)  SpO2: 93% (18 Dec 2023 05:22) (93% - 93%)    Parameters below as of 17 Dec 2023 21:05  Patient On (Oxygen Delivery Method): room air        Constitutional: Awake, alert, in no acute distress.   HEENT: Normocephalic, atraumatic, MICHELLE.  Respiratory: Lungs clear to ausculation bilaterally.   Cardiovascular: regular rhythm, normal S1 and S2, no audible murmurs.   GI: soft, non-tender, non-distended, bowel sounds present.  Extremities: No lower extremity edema.  Psychiatric: AAO x 3. Normal affect/mood.     LABS  CBC - WBC/HGB/HTC/PLT: 11.38/11.2/35.5/210 (12-18-23)  BMP - Na/K/Cl/Bicarb/BUN/Cr/Gluc/AG/eGFR: 135/3.4/99/25/8/0.78/124/11/109 (12-18-23)  Ca - 8.3 (12-18-23)  Phos - 2.1 (12-18-23)  Mg - 2.5 (12-18-23)  LFT - Alb/Tprot/Tbili/Dbili/AlkPhos/ALT/AST: 3.4/--/2.8/--/60/28/42 (12-17-23)  PT/aPTT/INR: 12.4/--/1.09 (12-17-23)   Thyroid Stimulating Hormone, Serum: 3.020 (12-16-23)          MEDICATIONS  MEDICATIONS  (STANDING):  acetaminophen     Tablet .. 650 milliGRAM(s) Oral every 6 hours  atorvastatin 20 milliGRAM(s) Oral at bedtime  enoxaparin Injectable 40 milliGRAM(s) SubCutaneous every 24 hours  influenza   Vaccine 0.5 milliLiter(s) IntraMuscular once  lactated ringers. 1000 milliLiter(s) (100 mL/Hr) IV Continuous <Continuous>  levothyroxine 150 MICROGram(s) Oral daily  losartan 50 milliGRAM(s) Oral with breakfast  polyethylene glycol 3350 17 Gram(s) Oral every 12 hours  senna 2 Tablet(s) Oral at bedtime    MEDICATIONS  (PRN):  melatonin 3 milliGRAM(s) Oral at bedtime PRN Insomnia  morphine  - Injectable 4 milliGRAM(s) IV Push every 4 hours PRN Severe Pain (7 - 10)    ASSESSMENT / RECOMMENDATIONS    Mr. Montgomery is a 48 year-old man with hx of ETOH pancreatitis, hypothyroidism, HTN, HLD and anxiety presented with epigastric pain to [facility] on 12/15. Pt was found to have acute pancreatitis.  Triglycerideries were measured 1660, endocrinology was consulted for hypertriglyceridemia.  pt states he has been on fibrate before a few weeks.  he states he is aware that his outpatient TAG were high, from what he recalls was around 400s.     He was diagnosed with thyroid cancer in 2015 and is status post total thyroidectomy. Pt does not know what type of cancer.  He follows with Dr. Tricia Noonan every 6 months. He takes Synthroid brand 150 mcg, 1 pill 6 days/week and 1.5 pills 1 day/week - 225mcg (average daily dose 161 mcg).    A1C: 5.9 %  BUN: 8  Creatinine: 0.78  GFR: 109  Weight: 102.1  BMI: 28.9    # hypertriglyceridemia  - 1660 --> 660 --> 671  - total cholesterol 346, HDL 37, Non   - 10 ASCVD risk 12.5%  - will start fenofibrate 140mg daily  - c/w statin    # hypocalcemia  - likely from pancreatitis / sponification process?  Calcium/Albumin  8.1 mg/dL (12-17 @ 05:30)  3.4 g/dL (12-17 @ 05:30)  8.5 mg/dL (12-17 @ 20:00)  8.3 mg/dL (12-18 @ 05:30)  - monitor  - please check PTH and vitamin D 25-OH    # Hx of thyroid cancer  # Hypothyroidism  - TSH 3.02, check free T 4  - c/w levothyroxine 150mcg 6 times a week and 225mcg one time a week  - recheck TFTs outpatient    # Pre-diabetes  - a1c 5.9  - carbohydrate controlled diet  - MISS meals and nightly    Case discussed with Dr. Figueroa. Primary team updated.       Elina Hernandez  Endocrinology Fellow    Service Pager: [phone]    SUBJECTIVE / INTERVAL HPI: Patient was seen and examined this morning.     Overnight events:  Currently on clear liquid diet  pt states that he is experiencing moderate amount of pain  denies nausea and vomiting    HPI  Mr. Montgomery is a 48 year-old man with hx of ETOH pancreatitis, hypothyroidism, HTN, HLD and anxiety presented with epigastric pain to [facility] on 12/15. Pt was found to have acute pancreatitis.  Triglycerideries were measured 1660, endocrinology was consulted for hypertriglyceridemia.  pt states he has been on fibrate before a few weeks.  he states he is aware that his outpatient TAG were high, from what he recalls was around 400s.     He was diagnosed with thyroid cancer in 2015 and is status post total thyroidectomy. Pt does not know what type of cancer.  He follows with Dr. Tricia Noonan every 6 months. He takes Synthroid brand 150 mcg, 1 pill 6 days/week and 1.5 pills 1 day/week - 225mcg (average daily dose 161 mcg).    REVIEW OF SYSTEMS  Constitutional:  Negative fever, chills or loss of appetite.  Eyes:  Negative blurry vision or double vision.  Cardiovascular:  Negative for chest pain or palpitations.  Respiratory:  Negative for cough, wheezing, or shortness of breath.    Gastrointestinal:  Negative for nausea, vomiting, diarrhea, constipation, or abdominal pain.  Genitourinary:  Negative frequency, urgency or dysuria.  Neurologic:  No headache, confusion, dizziness, lightheadedness.    PHYSICAL EXAM  Vital Signs Last 24 Hrs  T(C): 37.6 (18 Dec 2023 05:22), Max: 37.6 (18 Dec 2023 05:22)  T(F): 99.7 (18 Dec 2023 05:22), Max: 99.7 (18 Dec 2023 05:22)  HR: 89 (18 Dec 2023 07:20) (85 - 107)  BP: 144/91 (18 Dec 2023 07:20) (121/70 - 150/90)  BP(mean): 87 (17 Dec 2023 15:22) (87 - 87)  RR: 20 (18 Dec 2023 05:22) (18 - 20)  SpO2: 93% (18 Dec 2023 05:22) (93% - 93%)    Parameters below as of 17 Dec 2023 21:05  Patient On (Oxygen Delivery Method): room air        Constitutional: Awake, alert, in no acute distress.   HEENT: Normocephalic, atraumatic, MICHELLE.  Respiratory: Lungs clear to ausculation bilaterally.   Cardiovascular: regular rhythm, normal S1 and S2, no audible murmurs.   GI: soft, non-tender, non-distended, bowel sounds present.  Extremities: No lower extremity edema.  Psychiatric: AAO x 3. Normal affect/mood.     LABS  CBC - WBC/HGB/HTC/PLT: 11.38/11.2/35.5/210 (12-18-23)  BMP - Na/K/Cl/Bicarb/BUN/Cr/Gluc/AG/eGFR: 135/3.4/99/25/8/0.78/124/11/109 (12-18-23)  Ca - 8.3 (12-18-23)  Phos - 2.1 (12-18-23)  Mg - 2.5 (12-18-23)  LFT - Alb/Tprot/Tbili/Dbili/AlkPhos/ALT/AST: 3.4/--/2.8/--/60/28/42 (12-17-23)  PT/aPTT/INR: 12.4/--/1.09 (12-17-23)   Thyroid Stimulating Hormone, Serum: 3.020 (12-16-23)          MEDICATIONS  MEDICATIONS  (STANDING):  acetaminophen     Tablet .. 650 milliGRAM(s) Oral every 6 hours  atorvastatin 20 milliGRAM(s) Oral at bedtime  enoxaparin Injectable 40 milliGRAM(s) SubCutaneous every 24 hours  influenza   Vaccine 0.5 milliLiter(s) IntraMuscular once  lactated ringers. 1000 milliLiter(s) (100 mL/Hr) IV Continuous <Continuous>  levothyroxine 150 MICROGram(s) Oral daily  losartan 50 milliGRAM(s) Oral with breakfast  polyethylene glycol 3350 17 Gram(s) Oral every 12 hours  senna 2 Tablet(s) Oral at bedtime    MEDICATIONS  (PRN):  melatonin 3 milliGRAM(s) Oral at bedtime PRN Insomnia  morphine  - Injectable 4 milliGRAM(s) IV Push every 4 hours PRN Severe Pain (7 - 10)    ASSESSMENT / RECOMMENDATIONS    Mr. Montgomery is a 48 year-old man with hx of ETOH pancreatitis, hypothyroidism, HTN, HLD and anxiety presented with epigastric pain to [facility] on 12/15. Pt was found to have acute pancreatitis.  Triglycerideries were measured 1660, endocrinology was consulted for hypertriglyceridemia.  pt states he has been on fibrate before a few weeks.  he states he is aware that his outpatient TAG were high, from what he recalls was around 400s.     He was diagnosed with thyroid cancer in 2015 and is status post total thyroidectomy. Pt does not know what type of cancer.  He follows with Dr. Tricia Noonan every 6 months. He takes Synthroid brand 150 mcg, 1 pill 6 days/week and 1.5 pills 1 day/week - 225mcg (average daily dose 161 mcg).    A1C: 5.9 %  BUN: 8  Creatinine: 0.78  GFR: 109  Weight: 102.1  BMI: 28.9    # hypertriglyceridemia  - 1660 --> 660 --> 671  - total cholesterol 346, HDL 37, Non   - 10 ASCVD risk 12.5%  - will start fenofibrate 140mg daily  - c/w statin    # hypocalcemia  - likely from pancreatitis / sponification process?  Calcium/Albumin  8.1 mg/dL (12-17 @ 05:30)  3.4 g/dL (12-17 @ 05:30)  8.5 mg/dL (12-17 @ 20:00)  8.3 mg/dL (12-18 @ 05:30)  - monitor  - please check PTH and vitamin D 25-OH    # Hx of thyroid cancer  # Hypothyroidism  - TSH 3.02, check free T 4  - c/w levothyroxine 150mcg 6 times a week and 225mcg one time a week  - recheck TFTs outpatient    # Pre-diabetes  - a1c 5.9  - carbohydrate controlled diet  - MISS meals and nightly    Case discussed with Dr. Figueroa. Primary team updated.       Elina Hernandez  Endocrinology Fellow    Service Pager: [phone]    SUBJECTIVE / INTERVAL HPI: Patient was seen and examined this morning.     Overnight events:  Currently on clear liquid diet  pt states that he is experiencing moderate amount of pain  denies nausea and vomiting    HPI  Mr. Montgomery is a 48 year-old man with hx of ETOH pancreatitis, hypothyroidism, HTN, HLD and anxiety presented with epigastric pain to [facility] on 12/15. Pt was found to have acute pancreatitis.  Triglycerideries were measured 1660, endocrinology was consulted for hypertriglyceridemia.  pt states he has been on fibrate before a few weeks.  he states he is aware that his outpatient TAG were high, from what he recalls was around 400s.     He was diagnosed with thyroid cancer in 2015 and is status post total thyroidectomy. Pt does not know what type of cancer.  He follows with Dr. Tricia Noonan every 6 months. He takes Synthroid brand 150 mcg, 1 pill 6 days/week and 1.5 pills 1 day/week - 225mcg (average daily dose 161 mcg).    REVIEW OF SYSTEMS  Constitutional:  Negative fever, chills or loss of appetite.  Eyes:  Negative blurry vision or double vision.  Cardiovascular:  Negative for chest pain or palpitations.  Respiratory:  Negative for cough, wheezing, or shortness of breath.    Gastrointestinal:  + abd pain   Genitourinary:  Negative frequency, urgency or dysuria.  Neurologic:  No headache, confusion, dizziness, lightheadedness.    PHYSICAL EXAM  Vital Signs Last 24 Hrs  T(C): 37.6 (18 Dec 2023 05:22), Max: 37.6 (18 Dec 2023 05:22)  T(F): 99.7 (18 Dec 2023 05:22), Max: 99.7 (18 Dec 2023 05:22)  HR: 89 (18 Dec 2023 07:20) (85 - 107)  BP: 144/91 (18 Dec 2023 07:20) (121/70 - 150/90)  BP(mean): 87 (17 Dec 2023 15:22) (87 - 87)  RR: 20 (18 Dec 2023 05:22) (18 - 20)  SpO2: 93% (18 Dec 2023 05:22) (93% - 93%)    Parameters below as of 17 Dec 2023 21:05  Patient On (Oxygen Delivery Method): room air        Constitutional: Awake, alert, in no acute distress.   HEENT: Normocephalic, atraumatic, MICHELLE.  Respiratory: Lungs clear to ausculation bilaterally.   Cardiovascular: regular rhythm, normal S1 and S2, no audible murmurs.   GI: distended, tender  Extremities: slight swelling  Psychiatric: AAO x 3. Normal affect/mood.     LABS  CBC - WBC/HGB/HTC/PLT: 11.38/11.2/35.5/210 (12-18-23)  BMP - Na/K/Cl/Bicarb/BUN/Cr/Gluc/AG/eGFR: 135/3.4/99/25/8/0.78/124/11/109 (12-18-23)  Ca - 8.3 (12-18-23)  Phos - 2.1 (12-18-23)  Mg - 2.5 (12-18-23)  LFT - Alb/Tprot/Tbili/Dbili/AlkPhos/ALT/AST: 3.4/--/2.8/--/60/28/42 (12-17-23)  PT/aPTT/INR: 12.4/--/1.09 (12-17-23)   Thyroid Stimulating Hormone, Serum: 3.020 (12-16-23)          MEDICATIONS  MEDICATIONS  (STANDING):  acetaminophen     Tablet .. 650 milliGRAM(s) Oral every 6 hours  atorvastatin 20 milliGRAM(s) Oral at bedtime  enoxaparin Injectable 40 milliGRAM(s) SubCutaneous every 24 hours  influenza   Vaccine 0.5 milliLiter(s) IntraMuscular once  lactated ringers. 1000 milliLiter(s) (100 mL/Hr) IV Continuous <Continuous>  levothyroxine 150 MICROGram(s) Oral daily  losartan 50 milliGRAM(s) Oral with breakfast  polyethylene glycol 3350 17 Gram(s) Oral every 12 hours  senna 2 Tablet(s) Oral at bedtime    MEDICATIONS  (PRN):  melatonin 3 milliGRAM(s) Oral at bedtime PRN Insomnia  morphine  - Injectable 4 milliGRAM(s) IV Push every 4 hours PRN Severe Pain (7 - 10)    ASSESSMENT / RECOMMENDATIONS    Mr. Montgomery is a 48 year-old man with hx of ETOH pancreatitis, hypothyroidism, HTN, HLD and anxiety presented with epigastric pain to [facility] on 12/15. Pt was found to have acute pancreatitis.  Triglycerideries were measured 1660, endocrinology was consulted for hypertriglyceridemia.  pt states he has been on fibrate before a few weeks.  he states he is aware that his outpatient TAG were high, from what he recalls was around 400s.     He was diagnosed with thyroid cancer in 2015 and is status post total thyroidectomy. Pt does not know what type of cancer.  He follows with Dr. Tricia Noonan every 6 months. He takes Synthroid brand 150 mcg, 1 pill 6 days/week and 1.5 pills 1 day/week - 225mcg (average daily dose 161 mcg).    A1C: 5.9 %  BUN: 8  Creatinine: 0.78  GFR: 109  Weight: 102.1  BMI: 28.9    # hypertriglyceridemia  - 1660 --> 660 --> 671  - total cholesterol 346, HDL 37, Non   - 10 ASCVD risk 12.5%  - will start fenofibrate 140mg daily  - c/w statin    # hypocalcemia  - likely from pancreatitis / sponification process?  Calcium/Albumin  8.1 mg/dL (12-17 @ 05:30)  3.4 g/dL (12-17 @ 05:30)  8.5 mg/dL (12-17 @ 20:00)  8.3 mg/dL (12-18 @ 05:30)  - monitor  - please check PTH and vitamin D 25-OH    # Hx of thyroid cancer  # Hypothyroidism  - TSH 3.02, check free T 4  - c/w levothyroxine 150mcg 6 times a week and 225mcg one time a week  - recheck TFTs outpatient    # Pre-diabetes  - a1c 5.9  - carbohydrate controlled diet  - MISS meals and nightly    Case discussed with Dr. Figueroa. Primary team updated.       Elina Hernandez  Endocrinology Fellow    Service Pager: [phone]    SUBJECTIVE / INTERVAL HPI: Patient was seen and examined this morning.     Overnight events:  Currently on clear liquid diet  pt states that he is experiencing moderate amount of pain  denies nausea and vomiting    HPI  Mr. Montgomery is a 48 year-old man with hx of ETOH pancreatitis, hypothyroidism, HTN, HLD and anxiety presented with epigastric pain to [facility] on 12/15. Pt was found to have acute pancreatitis.  Triglycerideries were measured 1660, endocrinology was consulted for hypertriglyceridemia.  pt states he has been on fibrate before a few weeks.  he states he is aware that his outpatient TAG were high, from what he recalls was around 400s.     He was diagnosed with thyroid cancer in 2015 and is status post total thyroidectomy. Pt does not know what type of cancer.  He follows with Dr. Tricia Noonan every 6 months. He takes Synthroid brand 150 mcg, 1 pill 6 days/week and 1.5 pills 1 day/week - 225mcg (average daily dose 161 mcg).    REVIEW OF SYSTEMS  Constitutional:  Negative fever, chills or loss of appetite.  Eyes:  Negative blurry vision or double vision.  Cardiovascular:  Negative for chest pain or palpitations.  Respiratory:  Negative for cough, wheezing, or shortness of breath.    Gastrointestinal:  + abd pain   Genitourinary:  Negative frequency, urgency or dysuria.  Neurologic:  No headache, confusion, dizziness, lightheadedness.    PHYSICAL EXAM  Vital Signs Last 24 Hrs  T(C): 37.6 (18 Dec 2023 05:22), Max: 37.6 (18 Dec 2023 05:22)  T(F): 99.7 (18 Dec 2023 05:22), Max: 99.7 (18 Dec 2023 05:22)  HR: 89 (18 Dec 2023 07:20) (85 - 107)  BP: 144/91 (18 Dec 2023 07:20) (121/70 - 150/90)  BP(mean): 87 (17 Dec 2023 15:22) (87 - 87)  RR: 20 (18 Dec 2023 05:22) (18 - 20)  SpO2: 93% (18 Dec 2023 05:22) (93% - 93%)    Parameters below as of 17 Dec 2023 21:05  Patient On (Oxygen Delivery Method): room air        Constitutional: Awake, alert, in no acute distress.   HEENT: Normocephalic, atraumatic, MICHELLE.  Respiratory: Lungs clear to ausculation bilaterally.   Cardiovascular: regular rhythm, normal S1 and S2, no audible murmurs.   GI: distended, tender  Extremities: slight swelling  Psychiatric: AAO x 3. Normal affect/mood.     LABS  CBC - WBC/HGB/HTC/PLT: 11.38/11.2/35.5/210 (12-18-23)  BMP - Na/K/Cl/Bicarb/BUN/Cr/Gluc/AG/eGFR: 135/3.4/99/25/8/0.78/124/11/109 (12-18-23)  Ca - 8.3 (12-18-23)  Phos - 2.1 (12-18-23)  Mg - 2.5 (12-18-23)  LFT - Alb/Tprot/Tbili/Dbili/AlkPhos/ALT/AST: 3.4/--/2.8/--/60/28/42 (12-17-23)  PT/aPTT/INR: 12.4/--/1.09 (12-17-23)   Thyroid Stimulating Hormone, Serum: 3.020 (12-16-23)          MEDICATIONS  MEDICATIONS  (STANDING):  acetaminophen     Tablet .. 650 milliGRAM(s) Oral every 6 hours  atorvastatin 20 milliGRAM(s) Oral at bedtime  enoxaparin Injectable 40 milliGRAM(s) SubCutaneous every 24 hours  influenza   Vaccine 0.5 milliLiter(s) IntraMuscular once  lactated ringers. 1000 milliLiter(s) (100 mL/Hr) IV Continuous <Continuous>  levothyroxine 150 MICROGram(s) Oral daily  losartan 50 milliGRAM(s) Oral with breakfast  polyethylene glycol 3350 17 Gram(s) Oral every 12 hours  senna 2 Tablet(s) Oral at bedtime    MEDICATIONS  (PRN):  melatonin 3 milliGRAM(s) Oral at bedtime PRN Insomnia  morphine  - Injectable 4 milliGRAM(s) IV Push every 4 hours PRN Severe Pain (7 - 10)    ASSESSMENT / RECOMMENDATIONS    Mr. Montgomery is a 48 year-old man with hx of ETOH pancreatitis, hypothyroidism, HTN, HLD and anxiety presented with epigastric pain to [facility] on 12/15. Pt was found to have acute pancreatitis.  Triglycerideries were measured 1660, endocrinology was consulted for hypertriglyceridemia.  pt states he has been on fibrate before a few weeks.  he states he is aware that his outpatient TAG were high, from what he recalls was around 400s.     He was diagnosed with thyroid cancer in 2015 and is status post total thyroidectomy. Pt does not know what type of cancer.  He follows with Dr. Tricia Noonan every 6 months. He takes Synthroid brand 150 mcg, 1 pill 6 days/week and 1.5 pills 1 day/week - 225mcg (average daily dose 161 mcg).    A1C: 5.9 %  BUN: 8  Creatinine: 0.78  GFR: 109  Weight: 102.1  BMI: 28.9    # hypertriglyceridemia  - 1660 --> 660 --> 671  - total cholesterol 346, HDL 37, Non   - 10 ASCVD risk 12.5%  - will start fenofibrate 140mg daily  - c/w statin    # hypocalcemia  - likely from pancreatitis / sponification process?  Calcium/Albumin  8.1 mg/dL (12-17 @ 05:30)  3.4 g/dL (12-17 @ 05:30)  8.5 mg/dL (12-17 @ 20:00)  8.3 mg/dL (12-18 @ 05:30)  - monitor  - please check PTH and vitamin D 25-OH    # Hx of thyroid cancer  # Hypothyroidism  - TSH 3.02, check free T 4  - c/w levothyroxine 150mcg 6 times a week and 225mcg one time a week  - recheck TFTs outpatient    # Pre-diabetes  - a1c 5.9  - carbohydrate controlled diet  - MISS meals and nightly    Case discussed with Dr. Figueroa. Primary team updated.       Elina Hernandez  Endocrinology Fellow    Service Pager: [phone]

## 2023-12-18 NOTE — PROGRESS NOTE ADULT - ASSESSMENT
47yo M PMHx HTN, HLD, anxiety, hypothyroidism s/p total thyroidectomy for thyroid cancer (2015), ETOH abuse, multiple prior hospitalizations for EtOH pancreatitis in the past (on approx 2 additional occasions), who presents with epigastric pain x2 days, found to have a recurrent episode of alcoholic pancreatitis. General surgery consulted for further evaluation of necrotic collection noted on CT. Currently, patient reports feeling progressively distended and reports not passing flatus or having BMs since Thursday. Patient has been taking morphine RTC since admission, contributing as well. Dx c/w acute pancreatitis with 3.5cm area of early necrotic change vs focal edema, will likely a component of ileus secondary to pancreatitis and opioid use.     Recommendations:  - No acute surgical intervention needed at this time   - Optimize pain control   - Recommend aggressive resuscitation - keep fluids at 120cc/hr   - PLEASE ENSURE STRICT I/O's   - Advance diet slowly or step down if pain remains uncontrolled   - Consider repeat CT in 5 days from last CT or earlier if clinical status deteriorates   - Team 1C will continue to follow, please call with any questions or concerns    Plan discussed with chief resident and attending, Dr. Garrido. 49yo M PMHx HTN, HLD, anxiety, hypothyroidism s/p total thyroidectomy for thyroid cancer (2015), ETOH abuse, multiple prior hospitalizations for EtOH pancreatitis in the past (on approx 2 additional occasions), who presents with epigastric pain x2 days, found to have a recurrent episode of alcoholic pancreatitis. General surgery consulted for further evaluation of necrotic collection noted on CT. Currently, patient reports feeling progressively distended and reports not passing flatus or having BMs since Thursday. Patient has been taking morphine RTC since admission, contributing as well. Dx c/w acute pancreatitis with 3.5cm area of early necrotic change vs focal edema, will likely a component of ileus secondary to pancreatitis and opioid use.     Recommendations:  - No acute surgical intervention needed at this time   - Optimize pain control   - Recommend aggressive resuscitation - keep fluids at 120cc/hr   - PLEASE ENSURE STRICT I/O's   - Advance diet slowly or step down if pain remains uncontrolled   - Consider repeat CT in 5 days from last CT or earlier if clinical status deteriorates   - Team 1C will continue to follow, please call with any questions or concerns    Plan discussed with chief resident and attending, Dr. Garrido. 47yo M PMHx HTN, HLD, anxiety, hypothyroidism s/p total thyroidectomy for thyroid cancer (2015), ETOH abuse, multiple prior hospitalizations for EtOH pancreatitis in the past (on approx 2 additional occasions), who presents with epigastric pain x2 days, found to have a recurrent episode of alcoholic pancreatitis. General surgery consulted for further evaluation of necrotic collection noted on CT. Currently, patient reports feeling progressively distended and reports not passing flatus or having BMs since Thursday. Patient has been taking morphine RTC since admission, contributing as well. Dx c/w acute pancreatitis with 3.5cm area of early necrotic change vs focal edema, will likely a component of ileus secondary to pancreatitis and opioid use.   Now having bowel function but persistent abdominal pain and bloating   Improving hypertriglyceridemia - 660 from 1660   Acute pancreatitis secondary to EtOH vs hypertriglyceridemia     Recommendations:  - No acute surgical intervention needed at this time   - Optimize pain control   - Recommend aggressive resuscitation - keep fluids at 120cc/hr   - PLEASE ENSURE STRICT I/O's   - Advance diet slowly or step down if pain remains uncontrolled   - Consider repeat CT in 5 days from last CT or earlier if clinical status deteriorates   - Team 1C will continue to follow, please call with any questions or concerns    Plan discussed with chief resident and attending, Dr. Garrido.

## 2023-12-18 NOTE — PROGRESS NOTE ADULT - PROBLEM SELECTOR PLAN 6
LOV 2- Low back pain    Requesting tramadol as discussed at appointment.     Last RX 1- #10 one tb every 4 hours prn pain Dr Choudhary        pt endorses long hx of alcohol consumption, currently repots consuming moderate amounts of wine multiple days per week. quit drinking x1 year but resumed etoh consumption during the pandemic. ed danette <3, ciwa score 0  pt offered referrals to counseling services, as he acknowledges his etoh consumption is problematic, plans to stop drinking & states he feels he is equipped to do so on his own, as he has done in the past. offered counseling resources/referrals but pt is not currently interested. encouraged to reach out for services as desired  ketonuria likely from acute alcohol consumption versus prolonged starvation  - consider thiamine, b12, folate supplementation  - pt's reported last drink was Tuesday, which puts him out of window of acute withdrawal, denies any hx of wd sx.    PLAN:  -ROSALIND protocol

## 2023-12-18 NOTE — PROGRESS NOTE ADULT - ATTENDING COMMENTS
Patient was seen and examined at bedside on 12/18/2023 at 1030 am. Patient reports feeling improved. Now passing gas and having a BM. Denies SOB, CP, N/V. ROS is otherwise negative. Vitals, labwork and pertinent imaging reviewed. Exam - NAD, AAO x 4, PERRLA, EOMI, MMM, supple neck, chest - CTA b/l,, CV - rrr, s1s2, no m/r/g, abd - soft, distended, improved TTP in epigastric region, + BS, ext - wwp, psych - normal affect    Plan:  -C/w IVF  -CLD advanced to full liquid  -Pain control  -Replete electrolytes  -CIWA

## 2023-12-18 NOTE — PROGRESS NOTE ADULT - ATTENDING COMMENTS
Pt seen on rounds this afternoon and events of the weekend reviewed.  48-yo man with a history of EtOH abuse and two previous episodes of acute pancreatitis, now admitted with another episode.  Alcohol intake had increased significantly over the past few months after a period of complete abstinence.  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  Has baseline hypertriglyceridemia, with values apparently in the 400 range without medication.  Had previous been prescribed fenofibrate, but took it only briefly before his physician apparently told him to stop it.  HIs current episodes was triggered either by the alcohol (as a direct effect) and/or the hypertriglyceridemia (the latter no doubt exacerbated by his increased alcohol intake)--cannot tell which, and could well be both  Has been started on a clear liquid diet, but may be having an mild exacerbation of pain post-prandially.  He has marked abdominal distension, presumably third-spacing of fluid from the pancreatitis  Has decreased breath sounds at both lung bases, probably limited inspiration due to the abdominal distension. Pt seen on rounds this afternoon and events of the weekend reviewed.  48-yo man with a history of EtOH abuse and two previous episodes of acute pancreatitis, now admitted with another episode.  Alcohol intake had increased significantly over the past few months after a period of complete abstinence.  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  Has baseline hypertriglyceridemia, with values apparently in the 400 range without medication.  Had previous been prescribed fenofibrate, but took it only briefly before his physician apparently told him to stop it.  HIs current episodes was triggered either by the alcohol (as a direct effect) and/or the hypertriglyceridemia (the latter no doubt exacerbated by his increased alcohol intake)--cannot tell which, and could well be both  Has been started on a clear liquid diet, but may be having an mild exacerbation of pain post-prandially.  He has marked abdominal distension, presumably third-spacing of fluid from the pancreatitis  Has decreased breath sounds at both lung bases, probably limited inspiration due to the abdominal distension, though cannot rule out pleural effusions.  TG level today 641  A1c level in the pre-DM range at 5.9%  --Would continue the rosuvastatin, but also start fenofibrate.  He should remain on fenofibrate indefinitely as protection against severe chylomicronemia  --Would be very cautious about advancing his diet.  Still has mild pain, and degree of third-spacing is of concern

## 2023-12-18 NOTE — PROGRESS NOTE ADULT - PROBLEM SELECTOR PLAN 3
Likely a complication of acute pancreatitis.  CT A/P reveal Possible small early necrotic change at the junction of body and tail.  8.5>7.7>8.2    -Continue to monitor Ca+2 Likely a complication of acute pancreatitis.  CT A/P reveal Possible small early necrotic change at the junction of body and tail.  8.5>7.7>8.2>8.1>8.5>8.3    -Continue to monitor Ca+2

## 2023-12-18 NOTE — PROGRESS NOTE ADULT - SUBJECTIVE AND OBJECTIVE BOX
SUBJECTIVE:   Patient seen and examined at bedside this AM   Endorsed worsened abdominal pain and bloating overnight but denied nausea, emesis, chills or fever   Passing flatus and had a loose bowel movement overnight   Voiding spontaneously       MEDICATIONS  (STANDING):  acetaminophen     Tablet .. 650 milliGRAM(s) Oral every 6 hours  atorvastatin 20 milliGRAM(s) Oral at bedtime  enoxaparin Injectable 40 milliGRAM(s) SubCutaneous every 24 hours  influenza   Vaccine 0.5 milliLiter(s) IntraMuscular once  lactated ringers. 1000 milliLiter(s) (100 mL/Hr) IV Continuous <Continuous>  levothyroxine 150 MICROGram(s) Oral daily  losartan 50 milliGRAM(s) Oral with breakfast  polyethylene glycol 3350 17 Gram(s) Oral every 12 hours  senna 2 Tablet(s) Oral at bedtime    MEDICATIONS  (PRN):  melatonin 3 milliGRAM(s) Oral at bedtime PRN Insomnia  morphine  - Injectable 4 milliGRAM(s) IV Push every 4 hours PRN Severe Pain (7 - 10)      Vital Signs Last 24 Hrs  T(C): 37.6 (18 Dec 2023 05:22), Max: 37.6 (18 Dec 2023 05:22)  T(F): 99.7 (18 Dec 2023 05:22), Max: 99.7 (18 Dec 2023 05:22)  HR: 89 (18 Dec 2023 07:20) (85 - 107)  BP: 144/91 (18 Dec 2023 07:20) (121/70 - 150/90)  BP(mean): 87 (17 Dec 2023 15:22) (87 - 87)  RR: 20 (18 Dec 2023 05:22) (18 - 20)  SpO2: 93% (18 Dec 2023 05:22) (93% - 93%)    Parameters below as of 17 Dec 2023 21:05  Patient On (Oxygen Delivery Method): room air      Physical Exam:  General: NAD, OOB to chair  Pulmonary: Nonlabored breathing, no respiratory distress  Cardiovascular: NSR  Abdominal: soft, mildly tender diffusely, moderately distended   Extremities: WWP, normal strength  Neuro: A/O x 3, CNs II-XII grossly intact, no focal deficits      I&O's Summary      LABS:                        11.2   11.38 )-----------( 210      ( 18 Dec 2023 05:30 )             35.5     12-18    135  |  99  |  8   ----------------------------<  124<H>  3.4<L>   |  25  |  0.78    Ca    8.3<L>      18 Dec 2023 05:30  Phos  2.1     12-18  Mg     2.5     12-18    TPro  6.9  /  Alb  3.4  /  TBili  2.8<H>  /  DBili  x   /  AST  42<H>  /  ALT  28  /  AlkPhos  60  12-17    PT/INR - ( 17 Dec 2023 05:30 )   PT: 12.4 sec;   INR: 1.09            Urinalysis Basic - ( 18 Dec 2023 05:30 )    Color: x / Appearance: x / SG: x / pH: x  Gluc: 124 mg/dL / Ketone: x  / Bili: x / Urobili: x   Blood: x / Protein: x / Nitrite: x   Leuk Esterase: x / RBC: x / WBC x   Sq Epi: x / Non Sq Epi: x / Bacteria: x      CAPILLARY BLOOD GLUCOSE        LIVER FUNCTIONS - ( 17 Dec 2023 05:30 )  Alb: 3.4 g/dL / Pro: 6.9 g/dL / ALK PHOS: 60 U/L / ALT: 28 U/L / AST: 42 U/L / GGT: x             RADIOLOGY & ADDITIONAL STUDIES:

## 2023-12-18 NOTE — PROGRESS NOTE ADULT - PROBLEM SELECTOR PLAN 4
Resolving  Likely Reactive 2/2 given acute pancreatitis.  Now downtrending, 16.12>14.03>13.45    -Continue to monitor CBC Downtrending to 11.38  Likely Reactive 2/2 given acute pancreatitis.    -Continue to monitor CBC

## 2023-12-19 VITALS
DIASTOLIC BLOOD PRESSURE: 88 MMHG | TEMPERATURE: 99 F | HEART RATE: 81 BPM | RESPIRATION RATE: 18 BRPM | SYSTOLIC BLOOD PRESSURE: 137 MMHG | OXYGEN SATURATION: 99 %

## 2023-12-19 LAB
24R-OH-CALCIDIOL SERPL-MCNC: 24.4 NG/ML — LOW (ref 30–80)
24R-OH-CALCIDIOL SERPL-MCNC: 24.4 NG/ML — LOW (ref 30–80)
ANION GAP SERPL CALC-SCNC: 11 MMOL/L — SIGNIFICANT CHANGE UP (ref 5–17)
ANION GAP SERPL CALC-SCNC: 11 MMOL/L — SIGNIFICANT CHANGE UP (ref 5–17)
BUN SERPL-MCNC: 7 MG/DL — SIGNIFICANT CHANGE UP (ref 7–23)
BUN SERPL-MCNC: 7 MG/DL — SIGNIFICANT CHANGE UP (ref 7–23)
CALCIUM SERPL-MCNC: 8.7 MG/DL — SIGNIFICANT CHANGE UP (ref 8.4–10.5)
CALCIUM SERPL-MCNC: 8.7 MG/DL — SIGNIFICANT CHANGE UP (ref 8.4–10.5)
CALCIUM SERPL-MCNC: 8.8 MG/DL — SIGNIFICANT CHANGE UP (ref 8.4–10.5)
CALCIUM SERPL-MCNC: 8.8 MG/DL — SIGNIFICANT CHANGE UP (ref 8.4–10.5)
CHLORIDE SERPL-SCNC: 103 MMOL/L — SIGNIFICANT CHANGE UP (ref 96–108)
CHLORIDE SERPL-SCNC: 103 MMOL/L — SIGNIFICANT CHANGE UP (ref 96–108)
CO2 SERPL-SCNC: 23 MMOL/L — SIGNIFICANT CHANGE UP (ref 22–31)
CO2 SERPL-SCNC: 23 MMOL/L — SIGNIFICANT CHANGE UP (ref 22–31)
CREAT SERPL-MCNC: 0.84 MG/DL — SIGNIFICANT CHANGE UP (ref 0.5–1.3)
CREAT SERPL-MCNC: 0.84 MG/DL — SIGNIFICANT CHANGE UP (ref 0.5–1.3)
EGFR: 107 ML/MIN/1.73M2 — SIGNIFICANT CHANGE UP
EGFR: 107 ML/MIN/1.73M2 — SIGNIFICANT CHANGE UP
GLUCOSE BLDC GLUCOMTR-MCNC: 110 MG/DL — HIGH (ref 70–99)
GLUCOSE BLDC GLUCOMTR-MCNC: 110 MG/DL — HIGH (ref 70–99)
GLUCOSE BLDC GLUCOMTR-MCNC: 141 MG/DL — HIGH (ref 70–99)
GLUCOSE BLDC GLUCOMTR-MCNC: 141 MG/DL — HIGH (ref 70–99)
GLUCOSE SERPL-MCNC: 123 MG/DL — HIGH (ref 70–99)
GLUCOSE SERPL-MCNC: 123 MG/DL — HIGH (ref 70–99)
HCT VFR BLD CALC: 35.1 % — LOW (ref 39–50)
HCT VFR BLD CALC: 35.1 % — LOW (ref 39–50)
HGB BLD-MCNC: 11.2 G/DL — LOW (ref 13–17)
HGB BLD-MCNC: 11.2 G/DL — LOW (ref 13–17)
MAGNESIUM SERPL-MCNC: 2.2 MG/DL — SIGNIFICANT CHANGE UP (ref 1.6–2.6)
MAGNESIUM SERPL-MCNC: 2.2 MG/DL — SIGNIFICANT CHANGE UP (ref 1.6–2.6)
MCHC RBC-ENTMCNC: 30.8 PG — SIGNIFICANT CHANGE UP (ref 27–34)
MCHC RBC-ENTMCNC: 30.8 PG — SIGNIFICANT CHANGE UP (ref 27–34)
MCHC RBC-ENTMCNC: 31.9 GM/DL — LOW (ref 32–36)
MCHC RBC-ENTMCNC: 31.9 GM/DL — LOW (ref 32–36)
MCV RBC AUTO: 96.4 FL — SIGNIFICANT CHANGE UP (ref 80–100)
MCV RBC AUTO: 96.4 FL — SIGNIFICANT CHANGE UP (ref 80–100)
NRBC # BLD: 0 /100 WBCS — SIGNIFICANT CHANGE UP (ref 0–0)
NRBC # BLD: 0 /100 WBCS — SIGNIFICANT CHANGE UP (ref 0–0)
PHOSPHATE SERPL-MCNC: 2.9 MG/DL — SIGNIFICANT CHANGE UP (ref 2.5–4.5)
PHOSPHATE SERPL-MCNC: 2.9 MG/DL — SIGNIFICANT CHANGE UP (ref 2.5–4.5)
PLATELET # BLD AUTO: 226 K/UL — SIGNIFICANT CHANGE UP (ref 150–400)
PLATELET # BLD AUTO: 226 K/UL — SIGNIFICANT CHANGE UP (ref 150–400)
POTASSIUM SERPL-MCNC: 4.1 MMOL/L — SIGNIFICANT CHANGE UP (ref 3.5–5.3)
POTASSIUM SERPL-MCNC: 4.1 MMOL/L — SIGNIFICANT CHANGE UP (ref 3.5–5.3)
POTASSIUM SERPL-SCNC: 4.1 MMOL/L — SIGNIFICANT CHANGE UP (ref 3.5–5.3)
POTASSIUM SERPL-SCNC: 4.1 MMOL/L — SIGNIFICANT CHANGE UP (ref 3.5–5.3)
PTH-INTACT FLD-MCNC: 48 PG/ML — SIGNIFICANT CHANGE UP (ref 15–65)
PTH-INTACT FLD-MCNC: 48 PG/ML — SIGNIFICANT CHANGE UP (ref 15–65)
RBC # BLD: 3.64 M/UL — LOW (ref 4.2–5.8)
RBC # BLD: 3.64 M/UL — LOW (ref 4.2–5.8)
RBC # FLD: 14.5 % — SIGNIFICANT CHANGE UP (ref 10.3–14.5)
RBC # FLD: 14.5 % — SIGNIFICANT CHANGE UP (ref 10.3–14.5)
SODIUM SERPL-SCNC: 137 MMOL/L — SIGNIFICANT CHANGE UP (ref 135–145)
SODIUM SERPL-SCNC: 137 MMOL/L — SIGNIFICANT CHANGE UP (ref 135–145)
T4 FREE SERPL-MCNC: 1.15 NG/DL — SIGNIFICANT CHANGE UP (ref 0.93–1.7)
T4 FREE SERPL-MCNC: 1.15 NG/DL — SIGNIFICANT CHANGE UP (ref 0.93–1.7)
TRIGL SERPL-MCNC: 442 MG/DL — HIGH
TRIGL SERPL-MCNC: 442 MG/DL — HIGH
WBC # BLD: 11.12 K/UL — HIGH (ref 3.8–10.5)
WBC # BLD: 11.12 K/UL — HIGH (ref 3.8–10.5)
WBC # FLD AUTO: 11.12 K/UL — HIGH (ref 3.8–10.5)
WBC # FLD AUTO: 11.12 K/UL — HIGH (ref 3.8–10.5)

## 2023-12-19 PROCEDURE — 87086 URINE CULTURE/COLONY COUNT: CPT

## 2023-12-19 PROCEDURE — 82652 VIT D 1 25-DIHYDROXY: CPT

## 2023-12-19 PROCEDURE — 82962 GLUCOSE BLOOD TEST: CPT

## 2023-12-19 PROCEDURE — 85025 COMPLETE CBC W/AUTO DIFF WBC: CPT

## 2023-12-19 PROCEDURE — 74177 CT ABD & PELVIS W/CONTRAST: CPT

## 2023-12-19 PROCEDURE — 82550 ASSAY OF CK (CPK): CPT

## 2023-12-19 PROCEDURE — 83735 ASSAY OF MAGNESIUM: CPT

## 2023-12-19 PROCEDURE — 81001 URINALYSIS AUTO W/SCOPE: CPT

## 2023-12-19 PROCEDURE — 82310 ASSAY OF CALCIUM: CPT

## 2023-12-19 PROCEDURE — 84478 ASSAY OF TRIGLYCERIDES: CPT

## 2023-12-19 PROCEDURE — 93005 ELECTROCARDIOGRAM TRACING: CPT

## 2023-12-19 PROCEDURE — 85027 COMPLETE CBC AUTOMATED: CPT

## 2023-12-19 PROCEDURE — 99285 EMERGENCY DEPT VISIT HI MDM: CPT

## 2023-12-19 PROCEDURE — 83036 HEMOGLOBIN GLYCOSYLATED A1C: CPT

## 2023-12-19 PROCEDURE — 82306 VITAMIN D 25 HYDROXY: CPT

## 2023-12-19 PROCEDURE — 99232 SBSQ HOSP IP/OBS MODERATE 35: CPT | Mod: GC

## 2023-12-19 PROCEDURE — 84484 ASSAY OF TROPONIN QUANT: CPT

## 2023-12-19 PROCEDURE — 96374 THER/PROPH/DIAG INJ IV PUSH: CPT

## 2023-12-19 PROCEDURE — 99239 HOSP IP/OBS DSCHRG MGMT >30: CPT | Mod: GC

## 2023-12-19 PROCEDURE — 82553 CREATINE MB FRACTION: CPT

## 2023-12-19 PROCEDURE — 84439 ASSAY OF FREE THYROXINE: CPT

## 2023-12-19 PROCEDURE — 76705 ECHO EXAM OF ABDOMEN: CPT

## 2023-12-19 PROCEDURE — 80061 LIPID PANEL: CPT

## 2023-12-19 PROCEDURE — 84100 ASSAY OF PHOSPHORUS: CPT

## 2023-12-19 PROCEDURE — 71045 X-RAY EXAM CHEST 1 VIEW: CPT

## 2023-12-19 PROCEDURE — 99232 SBSQ HOSP IP/OBS MODERATE 35: CPT

## 2023-12-19 PROCEDURE — 80048 BASIC METABOLIC PNL TOTAL CA: CPT

## 2023-12-19 PROCEDURE — 80307 DRUG TEST PRSMV CHEM ANLYZR: CPT

## 2023-12-19 PROCEDURE — 74019 RADEX ABDOMEN 2 VIEWS: CPT

## 2023-12-19 PROCEDURE — 36415 COLL VENOUS BLD VENIPUNCTURE: CPT

## 2023-12-19 PROCEDURE — 83970 ASSAY OF PARATHORMONE: CPT

## 2023-12-19 PROCEDURE — 80053 COMPREHEN METABOLIC PANEL: CPT

## 2023-12-19 PROCEDURE — 83605 ASSAY OF LACTIC ACID: CPT

## 2023-12-19 PROCEDURE — 83690 ASSAY OF LIPASE: CPT

## 2023-12-19 PROCEDURE — 84443 ASSAY THYROID STIM HORMONE: CPT

## 2023-12-19 RX ORDER — ATORVASTATIN CALCIUM 80 MG/1
1 TABLET, FILM COATED ORAL
Qty: 30 | Refills: 0
Start: 2023-12-19 | End: 2024-01-17

## 2023-12-19 RX ORDER — ACETAMINOPHEN 500 MG
2 TABLET ORAL
Qty: 56 | Refills: 0
Start: 2023-12-19 | End: 2023-12-25

## 2023-12-19 RX ORDER — OXYCODONE HYDROCHLORIDE 5 MG/1
1 TABLET ORAL
Qty: 12 | Refills: 0
Start: 2023-12-19 | End: 2023-12-21

## 2023-12-19 RX ORDER — FENOFIBRATE,MICRONIZED 130 MG
1 CAPSULE ORAL
Qty: 30 | Refills: 0
Start: 2023-12-19 | End: 2024-01-17

## 2023-12-19 RX ORDER — SODIUM CHLORIDE 9 MG/ML
1000 INJECTION, SOLUTION INTRAVENOUS
Refills: 0 | Status: DISCONTINUED | OUTPATIENT
Start: 2023-12-19 | End: 2023-12-19

## 2023-12-19 RX ORDER — LOSARTAN POTASSIUM 100 MG/1
1 TABLET, FILM COATED ORAL
Qty: 30 | Refills: 0
Start: 2023-12-19 | End: 2024-01-17

## 2023-12-19 RX ORDER — POLYETHYLENE GLYCOL 3350 17 G/17G
17 POWDER, FOR SOLUTION ORAL
Qty: 238 | Refills: 0
Start: 2023-12-19 | End: 2023-12-25

## 2023-12-19 RX ORDER — LEVOTHYROXINE SODIUM 125 MCG
1 TABLET ORAL
Qty: 0 | Refills: 0 | DISCHARGE
Start: 2023-12-19

## 2023-12-19 RX ORDER — SENNA PLUS 8.6 MG/1
2 TABLET ORAL
Qty: 14 | Refills: 0
Start: 2023-12-19 | End: 2023-12-25

## 2023-12-19 RX ORDER — OXYCODONE HYDROCHLORIDE 5 MG/1
1 TABLET ORAL
Qty: 0 | Refills: 0 | DISCHARGE
Start: 2023-12-19

## 2023-12-19 RX ADMIN — Medication 150 MICROGRAM(S): at 06:50

## 2023-12-19 RX ADMIN — Medication 650 MILLIGRAM(S): at 11:30

## 2023-12-19 RX ADMIN — OXYCODONE HYDROCHLORIDE 5 MILLIGRAM(S): 5 TABLET ORAL at 09:27

## 2023-12-19 RX ADMIN — Medication 145 MILLIGRAM(S): at 11:32

## 2023-12-19 RX ADMIN — Medication 650 MILLIGRAM(S): at 02:47

## 2023-12-19 RX ADMIN — OXYCODONE HYDROCHLORIDE 5 MILLIGRAM(S): 5 TABLET ORAL at 05:00

## 2023-12-19 RX ADMIN — OXYCODONE HYDROCHLORIDE 5 MILLIGRAM(S): 5 TABLET ORAL at 00:02

## 2023-12-19 RX ADMIN — OXYCODONE HYDROCHLORIDE 5 MILLIGRAM(S): 5 TABLET ORAL at 04:30

## 2023-12-19 RX ADMIN — LOSARTAN POTASSIUM 50 MILLIGRAM(S): 100 TABLET, FILM COATED ORAL at 07:32

## 2023-12-19 RX ADMIN — Medication 650 MILLIGRAM(S): at 02:17

## 2023-12-19 RX ADMIN — OXYCODONE HYDROCHLORIDE 5 MILLIGRAM(S): 5 TABLET ORAL at 15:02

## 2023-12-19 RX ADMIN — Medication 650 MILLIGRAM(S): at 17:51

## 2023-12-19 RX ADMIN — SODIUM CHLORIDE 100 MILLILITER(S): 9 INJECTION, SOLUTION INTRAVENOUS at 09:00

## 2023-12-19 RX ADMIN — OXYCODONE HYDROCHLORIDE 5 MILLIGRAM(S): 5 TABLET ORAL at 00:32

## 2023-12-19 NOTE — PROGRESS NOTE ADULT - PROBLEM SELECTOR PLAN 2
Resolved  134>135  Likely etiology is SIADH secondary to pain.    -f/u Urine lytes  -Continue to monitor CBC

## 2023-12-19 NOTE — PROGRESS NOTE ADULT - SUBJECTIVE AND OBJECTIVE BOX
Patient is a 48y old  Male who presents with a chief complaint of pancreatitis (19 Dec 2023 09:09)      INTERVAL HPI/OVERNIGHT EVENTS:          Vital Signs Last 24 Hrs  T(C): 36.5 (19 Dec 2023 05:32), Max: 36.8 (18 Dec 2023 16:11)  T(F): 97.7 (19 Dec 2023 05:32), Max: 98.3 (18 Dec 2023 16:11)  HR: 85 (19 Dec 2023 06:52) (81 - 86)  BP: 143/88 (19 Dec 2023 06:52) (122/78 - 145/94)  BP(mean): --  RR: 18 (19 Dec 2023 05:32) (18 - 19)  SpO2: 94% (19 Dec 2023 05:32) (93% - 95%)    Parameters below as of 19 Dec 2023 04:26  Patient On (Oxygen Delivery Method): room air        12-18-23 @ 07:01  -  12-19-23 @ 07:00  --------------------------------------------------------  IN: 0 mL / OUT: 700 mL / NET: -700 mL        PHYSICAL EXAM:  GENERAL: NAD, well-groomed, well-developed  HEAD:  Atraumatic, Normocephalic  EYES: EOMI, PERRLA, conjunctiva and sclera clear  ENMT: No tonsillar erythema, exudates, or enlargement; Moist mucous membranes, Good dentition, No lesions  NECK: Supple, No JVD, Normal thyroid  NERVOUS SYSTEM:  Alert & Oriented X3, Good concentration; Motor Strength 5/5 B/L upper and lower extremities; DTRs 2+ intact and symmetric  CHEST/LUNG: Clear to percussion bilaterally; No rales, rhonchi, wheezing, or rubs  HEART: Regular rate and rhythm; No murmurs, rubs, or gallops  ABDOMEN: Soft, Nontender, Nondistended; Bowel sounds present  EXTREMITIES:  2+ Peripheral Pulses, No clubbing, cyanosis, or edema  LYMPH: No lymphadenopathy noted  SKIN: No rashes or lesions    Consultant(s) Notes Reviewed:  [x ] YES  [ ] NO  Care Discussed with Consultants/Other Providers [ x] YES  [ ] NO    LABS:        RADIOLOGY & ADDITIONAL TESTS:    Imaging Personally Reviewed:  [ ] YES  [ ] NO  acetaminophen     Tablet .. 650 milliGRAM(s) Oral every 6 hours  atorvastatin 20 milliGRAM(s) Oral at bedtime  dextrose 5%. 1000 milliLiter(s) IV Continuous <Continuous>  dextrose 5%. 1000 milliLiter(s) IV Continuous <Continuous>  dextrose 50% Injectable 25 Gram(s) IV Push once  dextrose 50% Injectable 12.5 Gram(s) IV Push once  dextrose 50% Injectable 25 Gram(s) IV Push once  dextrose Oral Gel 15 Gram(s) Oral once PRN  enoxaparin Injectable 40 milliGRAM(s) SubCutaneous every 24 hours  fenofibrate Tablet 145 milliGRAM(s) Oral daily  glucagon  Injectable 1 milliGRAM(s) IntraMuscular once  influenza   Vaccine 0.5 milliLiter(s) IntraMuscular once  insulin lispro (ADMELOG) corrective regimen sliding scale   SubCutaneous three times a day before meals  lactated ringers. 1000 milliLiter(s) IV Continuous <Continuous>  lactated ringers. 1000 milliLiter(s) IV Continuous <Continuous>  levothyroxine 150 MICROGram(s) Oral daily  losartan 50 milliGRAM(s) Oral with breakfast  melatonin 3 milliGRAM(s) Oral at bedtime PRN  morphine  - Injectable 2 milliGRAM(s) IV Push every 6 hours PRN  oxyCODONE    IR 5 milliGRAM(s) Oral every 4 hours PRN  polyethylene glycol 3350 17 Gram(s) Oral every 12 hours  senna 2 Tablet(s) Oral at bedtime      HEALTH ISSUES - PROBLEM Dx:  Acute pancreatitis    Hyponatremia    Hypocalcemia    Leukocytosis    Fatty liver    Dysfunctional alcohol use    Hypothyroidism    HTN (hypertension)    HLD (hyperlipidemia)    Anxiety    SIRS without infection or organ dysfunction

## 2023-12-19 NOTE — PROGRESS NOTE ADULT - ATTENDING COMMENTS
Pt seen on rounds this afternoon.  Was still having moderate pain, and was still taking opiates intermittently.  Diet was advanced to full fluids, and was to be further advanced to a soft and bite-sized regular diet at supper.  He felt that his abdominal distension had decreased somewhat, though he appeared about the same to me.  He was still receiving IV fluids with NS.  TG level was down to 441 mg%  Glucoses remain moderately elevated to the 125-140 range. Pt seen on rounds this afternoon.  Was still having moderate pain, and was still taking opiates intermittently.  Diet was advanced to full fluids, and was to be further advanced to a soft and bite-sized regular diet at supper.  He felt that his abdominal distension had decreased somewhat, though he appeared about the same to me.  He was still receiving IV fluids with NS.  TG level was down to 441 mg%  Glucoses remain moderately elevated to the 125-140 range.  --Would continue the fenofibrate and statin  --He needs to stay on the fenofibrate long-term--as protection against exacerbations of hypertriglyceridemia if his alcohol intake resumes or if he develops andrey diabetes.  --As noted yesterday, the need for the statin will depend on his LDL and non-HDL cholesterol levels after he recovers from this episode.  --Will need to be watched for the development of diabetes  --If diet is to be advanced, there does not seem to be a need for an altered texture on the diet--i.e does not need minced and moist consistency.  Diet does need to be low fat

## 2023-12-19 NOTE — DISCHARGE NOTE PROVIDER - CARE PROVIDER_API CALL
ALPHONSO MAR P  [address] [address]  Phone: [phone]  Fax: ([phone]  Established Patient  Follow Up Time: 1 week   ALPHONSO MAR P  [address] [address]  Phone: [phone]  Fax: ([phone]  Established Patient  Follow Up Time: 1 week

## 2023-12-19 NOTE — DISCHARGE NOTE PROVIDER - NSDCMRMEDTOKEN_GEN_ALL_CORE_FT
levothyroxine 150 mcg (0.15 mg) oral tablet: 1 tab(s) orally once a day  oxyCODONE 5 mg oral tablet: 1 tab(s) orally every 4 hours As needed Moderate Pain (4 - 6)   acetaminophen 325 mg oral tablet: 2 tab(s) orally every 6 hours as needed for  mild pain  atorvastatin 20 mg oral tablet: 1 tab(s) orally once a day (at bedtime)  fenofibrate 145 mg oral tablet: 1 tab(s) orally once a day  levothyroxine 150 mcg (0.15 mg) oral tablet: 1 tab(s) orally once a day  losartan 50 mg oral tablet: 1 tab(s) orally once a day (before a meal)  oxyCODONE 5 mg oral tablet: 1 tab(s) orally every 4 hours As needed Moderate Pain (4 - 6)  polyethylene glycol 3350 oral powder for reconstitution: 17 gram(s) orally every 12 hours  senna leaf extract oral tablet: 2 tab(s) orally once a day (at bedtime)   acetaminophen 325 mg oral tablet: 2 tab(s) orally every 6 hours as needed for  mild pain  atorvastatin 20 mg oral tablet: 1 tab(s) orally once a day (at bedtime)  fenofibrate 145 mg oral tablet: 1 tab(s) orally once a day  levothyroxine 150 mcg (0.15 mg) oral tablet: 1 tab(s) orally once a day  losartan 50 mg oral tablet: 1 tab(s) orally once a day (before a meal)  oxyCODONE 5 mg oral tablet: 1 tab(s) orally every 4 hours As needed Moderate Pain (4 - 6)  oxyCODONE 5 mg oral tablet: 1 tab(s) orally 4 times a day as needed for  severe pain MDD: 4  polyethylene glycol 3350 oral powder for reconstitution: 17 gram(s) orally every 12 hours  senna leaf extract oral tablet: 2 tab(s) orally once a day (at bedtime)

## 2023-12-19 NOTE — DISCHARGE NOTE PROVIDER - PROVIDER TOKENS
PROVIDER:[TOKEN:[[id number]],FOLLOWUP:[1 week],ESTABLISHEDPATIENT:[T]] PROVIDER:[TOKEN:[[id number]],FOLLOWUP:[1 week],ESTABLISHEDPATIENT:[T]]

## 2023-12-19 NOTE — PROGRESS NOTE ADULT - PROBLEM SELECTOR PLAN 3
Likely a complication of acute pancreatitis.  CT A/P reveal Possible small early necrotic change at the junction of body and tail.  8.5>7.7>8.2>8.1>8.5>8.3    -Continue to monitor Ca+2

## 2023-12-19 NOTE — DIETITIAN INITIAL EVALUATION ADULT - OTHER INFO
48-year-old male with history of EtOH pancreatitis in the past, as well as PMHx hypothyroidism, HTN, HLD, & anxiety, who presents with epigastric pain x2 days. Per pt, he has been diagnosed with alcoholic pancreatitis twice in the past, with his initial diagnosis requiring hospitalization x10 days (on subsequent occasion pt went home from ED). He explains that he drinks "a lot" whenever he does drink, around 3-4 glasses of wine, but that he does not drink daily. Following his first bout of pancreatitis, states he quit drinking for 1 year but began consuming etoh again during covid, last drink several days ago.    Patient seen at bedside for nutrition assessment. Current diet order: full liquid. No known food allergies, reports that when he eats large amounts of shrimp, he gets itchy. No difficulty chewing/swallowing reported. Reports good appetite, says pain meds are helping with the abdominal pain. Reports he was eating well PTA before epigastric pain began. Provided nutrition education discussing low fat diet when advanced to solid foods, small frequent meals. Patient verbalized understanding. Dosing weight: 225 pounds, BMI 28.9, reports he had gained weight ~2 years ago and weight has been stable since. No pressure injuries documented. No edema documented. Denies N/V/D/C. Elevated triglycerides. Meds: bowel regimen. Observed with no overt signs and symptoms of muscle or fat wasting. Based on ASPEN guidelines, pt does not meet criteria for malnutrition. No cultural, ethnic, Methodist food preferences reported. See nutrition recommendations below.  48-year-old male with history of EtOH pancreatitis in the past, as well as PMHx hypothyroidism, HTN, HLD, & anxiety, who presents with epigastric pain x2 days. Per pt, he has been diagnosed with alcoholic pancreatitis twice in the past, with his initial diagnosis requiring hospitalization x10 days (on subsequent occasion pt went home from ED). He explains that he drinks "a lot" whenever he does drink, around 3-4 glasses of wine, but that he does not drink daily. Following his first bout of pancreatitis, states he quit drinking for 1 year but began consuming etoh again during covid, last drink several days ago.    Patient seen at bedside for nutrition assessment. Current diet order: full liquid. No known food allergies, reports that when he eats large amounts of shrimp, he gets itchy. No difficulty chewing/swallowing reported. Reports good appetite, says pain meds are helping with the abdominal pain. Reports he was eating well PTA before epigastric pain began. Provided nutrition education discussing low fat diet when advanced to solid foods, small frequent meals. Patient verbalized understanding. Dosing weight: 225 pounds, BMI 28.9, reports he had gained weight ~2 years ago and weight has been stable since. No pressure injuries documented. No edema documented. Denies N/V/D/C. Elevated triglycerides. Meds: bowel regimen. Observed with no overt signs and symptoms of muscle or fat wasting. Based on ASPEN guidelines, pt does not meet criteria for malnutrition. No cultural, ethnic, Christian food preferences reported. See nutrition recommendations below.

## 2023-12-19 NOTE — DISCHARGE NOTE NURSING/CASE MANAGEMENT/SOCIAL WORK - NSDCFUADDAPPT_GEN_ALL_CORE_FT
Please follow up with Primary care doctor in 1 week to monitor your pancreatitis, blood pressure and cholesterol.

## 2023-12-19 NOTE — PROGRESS NOTE ADULT - NS ATTEST RISK PROBLEM GEN_ALL_CORE FT
Severe pancreatitis with necrosis, d/w Endocrinology, surgery
Resolving pancreatitis with persistent hypertriglyceridemia
Pt with chylomicronemia and pancreatitis

## 2023-12-19 NOTE — DISCHARGE NOTE NURSING/CASE MANAGEMENT/SOCIAL WORK - PATIENT PORTAL LINK FT
You can access the FollowMyHealth Patient Portal offered by [facility] by registering at the following website: [email or web address]. By joining [employer]’s FollowMyHealth portal, you will also be able to view your health information using other applications (apps) compatible with our system. You can access the FollowMyHealth Patient Portal offered by [facility] by registering at the following website: [email or web address]. By joining [employer]’s FollowMyHealth portal, you will also be able to view your health information using other applications (apps) compatible with our system.

## 2023-12-19 NOTE — DISCHARGE NOTE PROVIDER - NSDCCPTREATMENT_GEN_ALL_CORE_FT
PRINCIPAL PROCEDURE  Procedure: CT abdomen  Findings and Treatment: 12/15/2023  FINDINGS:  LOWER CHEST: Mild atelectatic changes.  LIVER: Enlarged fatty liver.  BILE DUCTS: Normal caliber.  GALLBLADDER: Within normal limits.  SPLEEN: Within normal limits.  PANCREAS: Enlarged pancreas with effacement of the pancreatic contour.   3.5 cm area of ill-defined decreased enhancement at the junction of body   and tail of the pancreas which may represent small early necrotic change   versus focal edema. Moderate infiltration of the fat surrounding the   pancreas, worst around the pancreatic head with small dispersed fluid. No   drainable fluid collection. No pancreatic duct dilatation. ADRENALS:   Within normal limits.  KIDNEYS/URETERS: Within normal limits.  BLADDER: Within normal limits.  REPRODUCTIVE ORGANS: Prostate within normal limits.  BOWEL: Duodenal effacement and edema caused by pancreatitis. No bowel   obstruction. Appendix is normal.  PERITONEUM: No ascites.  VESSELS: Patent portal vein. No abdominal aortic aneurysm.  RETROPERITONEUM/LYMPH NODES: No lymphadenopathy.  ABDOMINAL WALL: Within normal limits.  BONES: Degenerative changes, worst at L4-L5 and L5-S1 levels.  IMPRESSION:  Acute pancreatitis. Possible small early necrotic change at the junction   of body and tail.  Hepatic steatosis. No radiopaque gallstones.

## 2023-12-19 NOTE — PROGRESS NOTE ADULT - SUBJECTIVE AND OBJECTIVE BOX
SUBJECTIVE / INTERVAL HPI: Patient was seen and examined this morning.     Overnight events:    CAPILLARY BLOOD GLUCOSE & INSULIN RECEIVED      REVIEW OF SYSTEMS  Constitutional:  Negative fever, chills or loss of appetite.  Eyes:  Negative blurry vision or double vision.  Cardiovascular:  Negative for chest pain or palpitations.  Respiratory:  Negative for cough, wheezing, or shortness of breath.    Gastrointestinal:  + abd pain   Genitourinary:  Negative frequency, urgency or dysuria.  Neurologic:  No headache, confusion, dizziness, lightheadedness.    PHYSICAL EXAM  Vital Signs Last 24 Hrs  T(C): 36.5 (19 Dec 2023 05:32), Max: 37 (18 Dec 2023 09:45)  T(F): 97.7 (19 Dec 2023 05:32), Max: 98.6 (18 Dec 2023 09:45)  HR: 85 (19 Dec 2023 06:52) (81 - 86)  BP: 143/88 (19 Dec 2023 06:52) (122/78 - 145/94)  BP(mean): --  RR: 18 (19 Dec 2023 05:32) (18 - 19)  SpO2: 94% (19 Dec 2023 05:32) (93% - 95%)    Parameters below as of 19 Dec 2023 04:26  Patient On (Oxygen Delivery Method): room air        Constitutional: Awake, alert, in no acute distress.   HEENT: Normocephalic, atraumatic, MICHELLE.  Respiratory: Lungs clear to ausculation bilaterally.   Cardiovascular: regular rhythm, normal S1 and S2, no audible murmurs.   GI: distended, tender  Extremities: slight swelling  Psychiatric: AAO x 3. Normal affect/mood. d.     LABS  CBC - WBC/HGB/HTC/PLT: 11.12/11.2/35.1/226 (12-19-23)  BMP - Na/K/Cl/Bicarb/BUN/Cr/Gluc/AG/eGFR: 137/4.1/103/23/7/0.84/123/11/107 (12-19-23)  Ca - 8.8 (12-19-23)  Phos - 2.9 (12-19-23)  Mg - 2.2 (12-19-23)  LFT - Alb/Tprot/Tbili/Dbili/AlkPhos/ALT/AST: 3.4/--/2.8/--/60/28/42 (12-17-23)  PT/aPTT/INR: 12.4/--/1.09 (12-17-23)   Thyroid Stimulating Hormone, Serum: 3.020 (12-16-23)        12-18-23 @ 07:01  -  12-19-23 @ 07:00  --------------------------------------------------------  IN: 0 mL / OUT: 700 mL / NET: -700 mL        MEDICATIONS  MEDICATIONS  (STANDING):  acetaminophen     Tablet .. 650 milliGRAM(s) Oral every 6 hours  atorvastatin 20 milliGRAM(s) Oral at bedtime  dextrose 5%. 1000 milliLiter(s) (100 mL/Hr) IV Continuous <Continuous>  dextrose 5%. 1000 milliLiter(s) (50 mL/Hr) IV Continuous <Continuous>  dextrose 50% Injectable 25 Gram(s) IV Push once  dextrose 50% Injectable 12.5 Gram(s) IV Push once  dextrose 50% Injectable 25 Gram(s) IV Push once  enoxaparin Injectable 40 milliGRAM(s) SubCutaneous every 24 hours  fenofibrate Tablet 145 milliGRAM(s) Oral daily  fenofibrate Tablet 145 milliGRAM(s) Oral every 24 hours  glucagon  Injectable 1 milliGRAM(s) IntraMuscular once  influenza   Vaccine 0.5 milliLiter(s) IntraMuscular once  insulin lispro (ADMELOG) corrective regimen sliding scale   SubCutaneous three times a day before meals  lactated ringers. 1000 milliLiter(s) (100 mL/Hr) IV Continuous <Continuous>  lactated ringers. 1000 milliLiter(s) (100 mL/Hr) IV Continuous <Continuous>  levothyroxine 150 MICROGram(s) Oral daily  losartan 50 milliGRAM(s) Oral with breakfast  polyethylene glycol 3350 17 Gram(s) Oral every 12 hours  senna 2 Tablet(s) Oral at bedtime    MEDICATIONS  (PRN):  dextrose Oral Gel 15 Gram(s) Oral once PRN Blood Glucose LESS THAN 70 milliGRAM(s)/deciliter  melatonin 3 milliGRAM(s) Oral at bedtime PRN Insomnia  morphine  - Injectable 2 milliGRAM(s) IV Push every 6 hours PRN breakthrough pain  oxyCODONE    IR 5 milliGRAM(s) Oral every 4 hours PRN Moderate Pain (4 - 6)    ASSESSMENT / RECOMMENDATIONS  Mr. Montgomery is a 48 year-old man with hx of ETOH pancreatitis, hypothyroidism, HTN, HLD and anxiety presented with epigastric pain to [facility] on 12/15. Pt was found to have acute pancreatitis.  Triglycerideries were measured 1660, endocrinology was consulted for hypertriglyceridemia.  pt states he has been on fibrate before a few weeks.  he states he is aware that his outpatient TAG were high, from what he recalls was around 400s.     He was diagnosed with thyroid cancer in 2015 and is status post total thyroidectomy. Pt does not know what type of cancer.  He follows with Dr. Tricia Noonan every 6 months. He takes Synthroid brand 150 mcg, 1 pill 6 days/week and 1.5 pills 1 day/week - 225mcg (average daily dose 161 mcg).    A1C: 5.9 %  BUN: 7  Creatinine: 0.84  GFR: 107  Weight: 102.1  BMI: 28.9      # hypertriglyceridemia  - 1660 --> 660 --> 671  - total cholesterol 346, HDL 37, Non   - 10 ASCVD risk 12.5%  - c/w  fenofibrate 145mg daily  - c/w statin    # hypocalcemia  - likely from pancreatitis / sponification process?  Calcium/Albumin  8.1 mg/dL (12-17 @ 05:30)  3.4 g/dL (12-17 @ 05:30)  8.5 mg/dL (12-17 @ 20:00)  8.3 mg/dL (12-18 @ 05:30)  8.3 mg/dL (12-18 @ 05:30)  8.8 mg/dL (12-19 @ 05:30)  - monitor  - please check PTH and vitamin D 25-OH    # Hx of thyroid cancer  # Hypothyroidism  - TSH 3.02, check free T 4  - c/w levothyroxine 150mcg 6 times a week and 225mcg one time a week  - recheck TFTs outpatient    # Pre-diabetes  - a1c 5.9  - carbohydrate controlled diet  - MISS meals and nightly      Case discussed with Dr. Figueroa. Primary team updated.       Elina Hernandez  Endocrinology Fellow    Service Pager: [phone]    SUBJECTIVE / INTERVAL HPI: Patient was seen and examined this morning.     Overnight events:    CAPILLARY BLOOD GLUCOSE & INSULIN RECEIVED      REVIEW OF SYSTEMS  Constitutional:  Negative fever, chills or loss of appetite.  Eyes:  Negative blurry vision or double vision.  Cardiovascular:  Negative for chest pain or palpitations.  Respiratory:  Negative for cough, wheezing, or shortness of breath.    Gastrointestinal:  + abd pain   Genitourinary:  Negative frequency, urgency or dysuria.  Neurologic:  No headache, confusion, dizziness, lightheadedness.    PHYSICAL EXAM  Vital Signs Last 24 Hrs  T(C): 36.5 (19 Dec 2023 05:32), Max: 37 (18 Dec 2023 09:45)  T(F): 97.7 (19 Dec 2023 05:32), Max: 98.6 (18 Dec 2023 09:45)  HR: 85 (19 Dec 2023 06:52) (81 - 86)  BP: 143/88 (19 Dec 2023 06:52) (122/78 - 145/94)  BP(mean): --  RR: 18 (19 Dec 2023 05:32) (18 - 19)  SpO2: 94% (19 Dec 2023 05:32) (93% - 95%)    Parameters below as of 19 Dec 2023 04:26  Patient On (Oxygen Delivery Method): room air        Constitutional: Awake, alert, in no acute distress.   HEENT: Normocephalic, atraumatic, MICHELLE.  Respiratory: Lungs clear to ausculation bilaterally.   Cardiovascular: regular rhythm, normal S1 and S2, no audible murmurs.   GI: distended, tender  Extremities: slight swelling  Psychiatric: AAO x 3. Normal affect/mood. d.     LABS  CBC - WBC/HGB/HTC/PLT: 11.12/11.2/35.1/226 (12-19-23)  BMP - Na/K/Cl/Bicarb/BUN/Cr/Gluc/AG/eGFR: 137/4.1/103/23/7/0.84/123/11/107 (12-19-23)  Ca - 8.8 (12-19-23)  Phos - 2.9 (12-19-23)  Mg - 2.2 (12-19-23)  LFT - Alb/Tprot/Tbili/Dbili/AlkPhos/ALT/AST: 3.4/--/2.8/--/60/28/42 (12-17-23)  PT/aPTT/INR: 12.4/--/1.09 (12-17-23)   Thyroid Stimulating Hormone, Serum: 3.020 (12-16-23)        12-18-23 @ 07:01  -  12-19-23 @ 07:00  --------------------------------------------------------  IN: 0 mL / OUT: 700 mL / NET: -700 mL        MEDICATIONS  MEDICATIONS  (STANDING):  acetaminophen     Tablet .. 650 milliGRAM(s) Oral every 6 hours  atorvastatin 20 milliGRAM(s) Oral at bedtime  dextrose 5%. 1000 milliLiter(s) (100 mL/Hr) IV Continuous <Continuous>  dextrose 5%. 1000 milliLiter(s) (50 mL/Hr) IV Continuous <Continuous>  dextrose 50% Injectable 25 Gram(s) IV Push once  dextrose 50% Injectable 12.5 Gram(s) IV Push once  dextrose 50% Injectable 25 Gram(s) IV Push once  enoxaparin Injectable 40 milliGRAM(s) SubCutaneous every 24 hours  fenofibrate Tablet 145 milliGRAM(s) Oral daily  fenofibrate Tablet 145 milliGRAM(s) Oral every 24 hours  glucagon  Injectable 1 milliGRAM(s) IntraMuscular once  influenza   Vaccine 0.5 milliLiter(s) IntraMuscular once  insulin lispro (ADMELOG) corrective regimen sliding scale   SubCutaneous three times a day before meals  lactated ringers. 1000 milliLiter(s) (100 mL/Hr) IV Continuous <Continuous>  lactated ringers. 1000 milliLiter(s) (100 mL/Hr) IV Continuous <Continuous>  levothyroxine 150 MICROGram(s) Oral daily  losartan 50 milliGRAM(s) Oral with breakfast  polyethylene glycol 3350 17 Gram(s) Oral every 12 hours  senna 2 Tablet(s) Oral at bedtime    MEDICATIONS  (PRN):  dextrose Oral Gel 15 Gram(s) Oral once PRN Blood Glucose LESS THAN 70 milliGRAM(s)/deciliter  melatonin 3 milliGRAM(s) Oral at bedtime PRN Insomnia  morphine  - Injectable 2 milliGRAM(s) IV Push every 6 hours PRN breakthrough pain  oxyCODONE    IR 5 milliGRAM(s) Oral every 4 hours PRN Moderate Pain (4 - 6)    ASSESSMENT / RECOMMENDATIONS  Mr. Montgomery is a 48 year-old man with hx of ETOH pancreatitis, hypothyroidism, HTN, HLD and anxiety presented with epigastric pain to [facility] on 12/15. Pt was found to have acute pancreatitis.  Triglycerideries were measured 1660, endocrinology was consulted for hypertriglyceridemia.  pt states he has been on fibrate before a few weeks.  he states he is aware that his outpatient TAG were high, from what he recalls was around 400s.     He was diagnosed with thyroid cancer in 2015 and is status post total thyroidectomy. Pt does not know what type of cancer.  He follows with Dr. Tricia Noonan every 6 months. He takes Synthroid brand 150 mcg, 1 pill 6 days/week and 1.5 pills 1 day/week - 225mcg (average daily dose 161 mcg).    A1C: 5.9 %  BUN: 7  Creatinine: 0.84  GFR: 107  Weight: 102.1  BMI: 28.9      # hypertriglyceridemia  - 1660 --> 660 --> 671  - total cholesterol 346, HDL 37, Non   - 10 ASCVD risk 12.5%  - c/w  fenofibrate 145mg daily  - c/w statin    # hypocalcemia  - likely from pancreatitis / sponification process?  Calcium/Albumin  8.1 mg/dL (12-17 @ 05:30)  3.4 g/dL (12-17 @ 05:30)  8.5 mg/dL (12-17 @ 20:00)  8.3 mg/dL (12-18 @ 05:30)  8.3 mg/dL (12-18 @ 05:30)  8.8 mg/dL (12-19 @ 05:30)  - monitor  - please check PTH and vitamin D 25-OH    # Hx of thyroid cancer  # Hypothyroidism  - TSH 3.02, check free T 4  - c/w levothyroxine 150mcg 6 times a week and 225mcg one time a week  - recheck TFTs outpatient    # Pre-diabetes  - a1c 5.9  - carbohydrate controlled diet  - MISS meals and nightly      Case discussed with Dr. Figueroa. Primary team updated.       Elina Hernandez  Endocrinology Fellow    Service Pager: [phone]    SUBJECTIVE / INTERVAL HPI: Patient was seen and examined this morning.     Overnight events:  pt reports feeling better   pt was advanced to soft and bite size diet  pt denies vomiting or nausea      REVIEW OF SYSTEMS  Constitutional:  Negative fever, chills or loss of appetite.  Eyes:  Negative blurry vision or double vision.  Cardiovascular:  Negative for chest pain or palpitations.  Respiratory:  Negative for cough, wheezing, or shortness of breath.    Gastrointestinal:  + abd pain   Genitourinary:  Negative frequency, urgency or dysuria.  Neurologic:  No headache, confusion, dizziness, lightheadedness.    PHYSICAL EXAM  Vital Signs Last 24 Hrs  T(C): 36.5 (19 Dec 2023 05:32), Max: 37 (18 Dec 2023 09:45)  T(F): 97.7 (19 Dec 2023 05:32), Max: 98.6 (18 Dec 2023 09:45)  HR: 85 (19 Dec 2023 06:52) (81 - 86)  BP: 143/88 (19 Dec 2023 06:52) (122/78 - 145/94)  BP(mean): --  RR: 18 (19 Dec 2023 05:32) (18 - 19)  SpO2: 94% (19 Dec 2023 05:32) (93% - 95%)    Parameters below as of 19 Dec 2023 04:26  Patient On (Oxygen Delivery Method): room air        Constitutional: Awake, alert, in no acute distress.   HEENT: Normocephalic, atraumatic, MICHELLE.  Respiratory: Lungs clear to ausculation bilaterally.   Cardiovascular: regular rhythm, normal S1 and S2, no audible murmurs.   GI: distended, tender  Extremities: slight swelling  Psychiatric: AAO x 3. Normal affect/mood. d.     LABS  CBC - WBC/HGB/HTC/PLT: 11.12/11.2/35.1/226 (12-19-23)  BMP - Na/K/Cl/Bicarb/BUN/Cr/Gluc/AG/eGFR: 137/4.1/103/23/7/0.84/123/11/107 (12-19-23)  Ca - 8.8 (12-19-23)  Phos - 2.9 (12-19-23)  Mg - 2.2 (12-19-23)  LFT - Alb/Tprot/Tbili/Dbili/AlkPhos/ALT/AST: 3.4/--/2.8/--/60/28/42 (12-17-23)  PT/aPTT/INR: 12.4/--/1.09 (12-17-23)   Thyroid Stimulating Hormone, Serum: 3.020 (12-16-23)        12-18-23 @ 07:01  -  12-19-23 @ 07:00  --------------------------------------------------------  IN: 0 mL / OUT: 700 mL / NET: -700 mL        MEDICATIONS  MEDICATIONS  (STANDING):  acetaminophen     Tablet .. 650 milliGRAM(s) Oral every 6 hours  atorvastatin 20 milliGRAM(s) Oral at bedtime  dextrose 5%. 1000 milliLiter(s) (100 mL/Hr) IV Continuous <Continuous>  dextrose 5%. 1000 milliLiter(s) (50 mL/Hr) IV Continuous <Continuous>  dextrose 50% Injectable 25 Gram(s) IV Push once  dextrose 50% Injectable 12.5 Gram(s) IV Push once  dextrose 50% Injectable 25 Gram(s) IV Push once  enoxaparin Injectable 40 milliGRAM(s) SubCutaneous every 24 hours  fenofibrate Tablet 145 milliGRAM(s) Oral daily  fenofibrate Tablet 145 milliGRAM(s) Oral every 24 hours  glucagon  Injectable 1 milliGRAM(s) IntraMuscular once  influenza   Vaccine 0.5 milliLiter(s) IntraMuscular once  insulin lispro (ADMELOG) corrective regimen sliding scale   SubCutaneous three times a day before meals  lactated ringers. 1000 milliLiter(s) (100 mL/Hr) IV Continuous <Continuous>  lactated ringers. 1000 milliLiter(s) (100 mL/Hr) IV Continuous <Continuous>  levothyroxine 150 MICROGram(s) Oral daily  losartan 50 milliGRAM(s) Oral with breakfast  polyethylene glycol 3350 17 Gram(s) Oral every 12 hours  senna 2 Tablet(s) Oral at bedtime    MEDICATIONS  (PRN):  dextrose Oral Gel 15 Gram(s) Oral once PRN Blood Glucose LESS THAN 70 milliGRAM(s)/deciliter  melatonin 3 milliGRAM(s) Oral at bedtime PRN Insomnia  morphine  - Injectable 2 milliGRAM(s) IV Push every 6 hours PRN breakthrough pain  oxyCODONE    IR 5 milliGRAM(s) Oral every 4 hours PRN Moderate Pain (4 - 6)    ASSESSMENT / RECOMMENDATIONS  Mr. Montgomery is a 48 year-old man with hx of ETOH pancreatitis, hypothyroidism, HTN, HLD and anxiety presented with epigastric pain to [facility] on 12/15. Pt was found to have acute pancreatitis.  Triglycerideries were measured 1660, endocrinology was consulted for hypertriglyceridemia.  pt states he has been on fibrate before a few weeks.  he states he is aware that his outpatient TAG were high, from what he recalls was around 400s.     He was diagnosed with thyroid cancer in 2015 and is status post total thyroidectomy. Pt does not know what type of cancer.  He follows with Dr. Tricia Noonan every 6 months. He takes Synthroid brand 150 mcg, 1 pill 6 days/week and 1.5 pills 1 day/week - 225mcg (average daily dose 161 mcg).    A1C: 5.9 %  BUN: 7  Creatinine: 0.84  GFR: 107  Weight: 102.1  BMI: 28.9      # hypertriglyceridemia  - 1660 --> 660 --> 671 --> 442  - total cholesterol 346, HDL 37, Non   - 10 ASCVD risk 12.5%  - c/w  fenofibrate 145mg daily  - c/w statin    # hypocalcemia  - likely from pancreatitis / sponification process?, now improving  Calcium/Albumin  8.1 mg/dL (12-17 @ 05:30)  3.4 g/dL (12-17 @ 05:30)  8.5 mg/dL (12-17 @ 20:00)  8.3 mg/dL (12-18 @ 05:30)  8.3 mg/dL (12-18 @ 05:30)  8.8 mg/dL (12-19 @ 05:30)  - monitor  - please check PTH and vitamin D 25-OH    # Hx of thyroid cancer  # Hypothyroidism  - TSH 3.02, check free T 4  - c/w levothyroxine 150mcg 6 times a week and 225mcg one time a week  - recheck TFTs outpatient    # Pre-diabetes  - a1c 5.9  - carbohydrate controlled diet  - MISS meals and nightly      Case discussed with Dr. Figueroa. Primary team updated.       Elina Hernandez  Endocrinology Fellow    Service Pager: [phone]    SUBJECTIVE / INTERVAL HPI: Patient was seen and examined this morning.     Overnight events:  pt reports feeling better   pt was advanced to soft and bite size diet  pt denies vomiting or nausea      REVIEW OF SYSTEMS  Constitutional:  Negative fever, chills or loss of appetite.  Eyes:  Negative blurry vision or double vision.  Cardiovascular:  Negative for chest pain or palpitations.  Respiratory:  Negative for cough, wheezing, or shortness of breath.    Gastrointestinal:  + abd pain   Genitourinary:  Negative frequency, urgency or dysuria.  Neurologic:  No headache, confusion, dizziness, lightheadedness.    PHYSICAL EXAM  Vital Signs Last 24 Hrs  T(C): 36.5 (19 Dec 2023 05:32), Max: 37 (18 Dec 2023 09:45)  T(F): 97.7 (19 Dec 2023 05:32), Max: 98.6 (18 Dec 2023 09:45)  HR: 85 (19 Dec 2023 06:52) (81 - 86)  BP: 143/88 (19 Dec 2023 06:52) (122/78 - 145/94)  BP(mean): --  RR: 18 (19 Dec 2023 05:32) (18 - 19)  SpO2: 94% (19 Dec 2023 05:32) (93% - 95%)    Parameters below as of 19 Dec 2023 04:26  Patient On (Oxygen Delivery Method): room air        Constitutional: Awake, alert, in no acute distress.   HEENT: Normocephalic, atraumatic, MICHELLE.  Respiratory: Lungs clear to ausculation bilaterally.   Cardiovascular: regular rhythm, normal S1 and S2, no audible murmurs.   GI: distended, tender  Extremities: slight swelling  Psychiatric: AAO x 3. Normal affect/mood. d.     LABS  CBC - WBC/HGB/HTC/PLT: 11.12/11.2/35.1/226 (12-19-23)  BMP - Na/K/Cl/Bicarb/BUN/Cr/Gluc/AG/eGFR: 137/4.1/103/23/7/0.84/123/11/107 (12-19-23)  Ca - 8.8 (12-19-23)  Phos - 2.9 (12-19-23)  Mg - 2.2 (12-19-23)  LFT - Alb/Tprot/Tbili/Dbili/AlkPhos/ALT/AST: 3.4/--/2.8/--/60/28/42 (12-17-23)  PT/aPTT/INR: 12.4/--/1.09 (12-17-23)   Thyroid Stimulating Hormone, Serum: 3.020 (12-16-23)        12-18-23 @ 07:01  -  12-19-23 @ 07:00  --------------------------------------------------------  IN: 0 mL / OUT: 700 mL / NET: -700 mL        MEDICATIONS  MEDICATIONS  (STANDING):  acetaminophen     Tablet .. 650 milliGRAM(s) Oral every 6 hours  atorvastatin 20 milliGRAM(s) Oral at bedtime  dextrose 5%. 1000 milliLiter(s) (100 mL/Hr) IV Continuous <Continuous>  dextrose 5%. 1000 milliLiter(s) (50 mL/Hr) IV Continuous <Continuous>  dextrose 50% Injectable 25 Gram(s) IV Push once  dextrose 50% Injectable 12.5 Gram(s) IV Push once  dextrose 50% Injectable 25 Gram(s) IV Push once  enoxaparin Injectable 40 milliGRAM(s) SubCutaneous every 24 hours  fenofibrate Tablet 145 milliGRAM(s) Oral daily  fenofibrate Tablet 145 milliGRAM(s) Oral every 24 hours  glucagon  Injectable 1 milliGRAM(s) IntraMuscular once  influenza   Vaccine 0.5 milliLiter(s) IntraMuscular once  insulin lispro (ADMELOG) corrective regimen sliding scale   SubCutaneous three times a day before meals  lactated ringers. 1000 milliLiter(s) (100 mL/Hr) IV Continuous <Continuous>  lactated ringers. 1000 milliLiter(s) (100 mL/Hr) IV Continuous <Continuous>  levothyroxine 150 MICROGram(s) Oral daily  losartan 50 milliGRAM(s) Oral with breakfast  polyethylene glycol 3350 17 Gram(s) Oral every 12 hours  senna 2 Tablet(s) Oral at bedtime    MEDICATIONS  (PRN):  dextrose Oral Gel 15 Gram(s) Oral once PRN Blood Glucose LESS THAN 70 milliGRAM(s)/deciliter  melatonin 3 milliGRAM(s) Oral at bedtime PRN Insomnia  morphine  - Injectable 2 milliGRAM(s) IV Push every 6 hours PRN breakthrough pain  oxyCODONE    IR 5 milliGRAM(s) Oral every 4 hours PRN Moderate Pain (4 - 6)    ASSESSMENT / RECOMMENDATIONS  Mr. Montgomery is a 48 year-old man with hx of ETOH pancreatitis, hypothyroidism, HTN, HLD and anxiety presented with epigastric pain to [facility] on 12/15. Pt was found to have acute pancreatitis.  Triglycerideries were measured 1660, endocrinology was consulted for hypertriglyceridemia.  pt states he has been on fibrate before a few weeks.  he states he is aware that his outpatient TAG were high, from what he recalls was around 400s.     He was diagnosed with thyroid cancer in 2015 and is status post total thyroidectomy. Pt does not know what type of cancer.  He follows with Dr. Tricia Noonan every 6 months. He takes Synthroid brand 150 mcg, 1 pill 6 days/week and 1.5 pills 1 day/week - 225mcg (average daily dose 161 mcg).    A1C: 5.9 %  BUN: 7  Creatinine: 0.84  GFR: 107  Weight: 102.1  BMI: 28.9      # hypertriglyceridemia  - 1660 --> 660 --> 671 --> 442  - total cholesterol 346, HDL 37, Non   - 10 ASCVD risk 12.5%  - c/w  fenofibrate 145mg daily  - c/w statin    # hypocalcemia  - likely from pancreatitis / sponification process?, now improving  Calcium/Albumin  8.1 mg/dL (12-17 @ 05:30)  3.4 g/dL (12-17 @ 05:30)  8.5 mg/dL (12-17 @ 20:00)  8.3 mg/dL (12-18 @ 05:30)  8.3 mg/dL (12-18 @ 05:30)  8.8 mg/dL (12-19 @ 05:30)  - monitor  - please check PTH and vitamin D 25-OH    # Hx of thyroid cancer  # Hypothyroidism  - TSH 3.02, check free T 4  - c/w levothyroxine 150mcg 6 times a week and 225mcg one time a week  - recheck TFTs outpatient    # Pre-diabetes  - a1c 5.9  - carbohydrate controlled diet  - MISS meals and nightly      Case discussed with Dr. Figueroa. Primary team updated.       Elina Hernandez  Endocrinology Fellow    Service Pager: [phone]

## 2023-12-19 NOTE — DIETITIAN INITIAL EVALUATION ADULT - OTHER CALCULATIONS
Based on Standards of Care pt within % IBW (118%) thus actual body weight used for all calculations. Needs adjusted for acute pancreatitis.

## 2023-12-19 NOTE — PROGRESS NOTE ADULT - PROBLEM SELECTOR PROBLEM 11
SIRS without infection or organ dysfunction

## 2023-12-19 NOTE — PROGRESS NOTE ADULT - PROBLEM SELECTOR PLAN 9
PMHx HLD, follows regularly w PCP, takes rosuvastatin at home  - continue Atorvastatin 20mg QD  - initiate Fenofibrate as above

## 2023-12-19 NOTE — PROGRESS NOTE ADULT - PROBLEM SELECTOR PLAN 8
PMHx HTN, follows regularly w PCP (Dr. La?), home medications include propanolol 20mg qhs, losartan (unsure of dose)  - Continue Losartan 50mg QD

## 2023-12-19 NOTE — DIETITIAN INITIAL EVALUATION ADULT - PROBLEM SELECTOR PLAN 4
PMHx hyperthyroidism, s/p total thyroidectomy for malignant nodule, takes synthroid 150mcg every morning except sundays, on which days pt reports he takes 300mcg  - continue synthroid 150mcg qd  - follow up am tsh

## 2023-12-19 NOTE — DISCHARGE NOTE PROVIDER - HOSPITAL COURSE
#Discharge: do not delete    Patient is __ yo M/F with past medical history of _____ presented with _____, found to have _____ (one liner)    Hospital course (by problem):     Patient was discharged to: (home/[facility]/acute rehab/hospice, etc, and with what services – home health PT/RN? 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    Hospital course (by problem):     #Acute pancreatitis.   Likely 2/2 chronic etoh consumption (reports 4 glasses of wine 5 days/week) and hypertriglyceridemia given Triglycerides= 1660, TG now downtrending to 660>442. Lipase >375. [place]'s criteria: 0. CT A/P revealed Acute pancreatitis with Possible small early necrotic change at the junction of body and tail. Placed on continuous fluids (1L NS, 100cc/hr). Pain regimen ordered. Switched from IV Morphine 4mg Q4 PRN to PO Oxycodone 5mg Q4 PRN with IV Morphine 2mg Q6 for breakthrough pain. General surgery consulted regarding necrotic changes visualized on CT A/P and decreased bowel motility on initial admission, however per recs no surgical intervention at this time and strict I&Os ordered. Diet advanced as tolerated to clear liquids, full liquids, then regular with low fat/consistent carb/Ensure Max 1 can TID. Endocrine consulted regarding hypertriglyceridemia, per recs started Fenofibrate 145mg QD.    #Hyponatremia.   Resolved. 134>135>137  Likely etiology is SIADH secondary to pain, consistent with urine lytes.  -Continue to monitor CBC.    #Hypocalcemia.   Resolved. Likely a complication of acute pancreatitis. CT A/P reveal Possible small early necrotic change at the junction of body and tail. 8.5>7.7>8.2>8.1>8.5>8.3>8.8.   -Continue to monitor Ca+2.    #Leukocytosis.   Downtrending to 11.38>11.12. Likely Reactive 2/2 given acute pancreatitis.  -Continue to monitor CBC.    #Fatty liver.   Likely 2/2 Alcohol use. CT A/P revealed hepatosteatosis. FIB-4: 1.25, therefore Low fibrosis score and is not appropriate candidate that requires liver biopsy. AST upretrending 32>25>42.  - continue to monitor AST/ALTs  - offer alcohol use cessation counseling (see below).    #Dysfunctional alcohol use.   Pt endorses long hx of alcohol consumption, currently reports consuming moderate amounts of wine multiple days per week. quit drinking x1 year but resumed etoh consumption during the pandemic. ed JOSE <3, CIWA score 0. Pt offered referrals to counseling services, as he acknowledges his etoh consumption is problematic, plans to stop drinking & states he feels he is equipped to do so on his own, as he has done in the past. Pt is not currently interested in referrals to counseling services. encouraged to reach out for services as desired  ketonuria likely from acute alcohol consumption versus prolonged starvation.  -CIWA protocol.    #Hypothyroidism.    PMHx hyperthyroidism, s/p total thyroidectomy for malignant nodule, takes synthroid 150mcg every morning except sundays, on which days pt reports he takes 300mcg. TSH: 3.020, wnl.  - continue synthroid 150mcg qd.    #HTN (hypertension).   Follows regularly w PCP (Dr. La), home medications include propanolol 20mg qhs, losartan (unsure of dose)  - Continue Losartan 50mg QD.    #HLD (hyperlipidemia).   Follows regularly w PCP, takes rosuvastatin at home  - continue Atorvastatin 20mg QD  - initiate Fenofibrate as above.    #Anxiety.   Pt prescribed valium, of which he reports taking 2.5mg on rare occasions when unable to sleep  - prn melatonin for insomnia while inpatient.        Patient was discharged to: Home    New medications: Fenofibrate  Changes to old medications: None  Medications that were stopped: None    Items to follow up as outpatient: Please follow up with PCP in 1 week.  Labs to be followed outpatient: Triglycerides, liver enzymes    Exam to be followed outpatient: Abdominal distension    Physical exam at the time of discharge:  GENERAL: +Appears in discomfort/distress 2/2 pain, well-groomed, well-developed, sitting in chair beside bed.  HEAD:  Atraumatic, Normocephalic  EYES: conjunctiva and sclera clear  ENMT: Moist mucous membranes  NECK: Supple  NERVOUS SYSTEM:  Alert & Oriented X3, Good concentration  CHEST/LUNG: Clear to percussion bilaterally; No rales, rhonchi, wheezing, or rubs  HEART: Regular rate and rhythm; No murmurs, rubs, or gallops  ABDOMEN: Soft, Nontender, +Distended; Bowel sounds present  EXTREMITIES:  2+ Peripheral Pulses, No clubbing, cyanosis, or edema  SKIN: No rashes or lesions                 #Discharge: do not delete    48M w PMHx alcohol-induced pancreatitis, hypothyroidism, HTN, HLD, and anxiety, presenting with complaint of epigastric pain x2 days, admitted for management of alcoholic pancreatitis.    Hospital course (by problem):     #Acute pancreatitis.   Likely 2/2 chronic etoh consumption (reports 4 glasses of wine 5 days/week) and hypertriglyceridemia given Triglycerides= 1660, TG now downtrending to 660>442. Lipase >375. [place]'s criteria: 0. CT A/P revealed Acute pancreatitis with Possible small early necrotic change at the junction of body and tail. Placed on continuous fluids (1L NS, 100cc/hr). Pain regimen ordered. Switched from IV Morphine 4mg Q4 PRN to PO Oxycodone 5mg Q4 PRN with IV Morphine 2mg Q6 for breakthrough pain. General surgery consulted regarding necrotic changes visualized on CT A/P and decreased bowel motility on initial admission, however per recs no surgical intervention at this time and strict I&Os ordered. Diet advanced as tolerated to clear liquids, full liquids, then regular with low fat/consistent carb/Ensure Max 1 can TID. Endocrine consulted regarding hypertriglyceridemia, per recs started Fenofibrate 145mg QD.    #Hyponatremia.   Resolved. 134>135>137  Likely etiology is SIADH secondary to pain, consistent with urine lytes.  -Continue to monitor CBC.    #Hypocalcemia.   Resolved. Likely a complication of acute pancreatitis. CT A/P reveal Possible small early necrotic change at the junction of body and tail. 8.5>7.7>8.2>8.1>8.5>8.3>8.8.   -Continue to monitor Ca+2.    #Leukocytosis.   Downtrending to 11.38>11.12. Likely Reactive 2/2 given acute pancreatitis.  -Continue to monitor CBC.    #Fatty liver.   Likely 2/2 Alcohol use. CT A/P revealed hepatosteatosis. FIB-4: 1.25, therefore Low fibrosis score and is not appropriate candidate that requires liver biopsy. AST upretrending 32>25>42.  - continue to monitor AST/ALTs  - offer alcohol use cessation counseling (see below).    #Dysfunctional alcohol use.   Pt endorses long hx of alcohol consumption, currently reports consuming moderate amounts of wine multiple days per week. quit drinking x1 year but resumed etoh consumption during the pandemic. ed JOSE <3, CIWA score 0. Pt offered referrals to counseling services, as he acknowledges his etoh consumption is problematic, plans to stop drinking & states he feels he is equipped to do so on his own, as he has done in the past. Pt is not currently interested in referrals to counseling services. encouraged to reach out for services as desired  ketonuria likely from acute alcohol consumption versus prolonged starvation.  -CIWA protocol.    #Hypothyroidism.    PMHx hyperthyroidism, s/p total thyroidectomy for malignant nodule, takes synthroid 150mcg every morning except sundays, on which days pt reports he takes 300mcg. TSH: 3.020, wnl.  - continue synthroid 150mcg qd.    #HTN (hypertension).   Follows regularly w PCP (Dr. La), home medications include propanolol 20mg qhs, losartan (unsure of dose)  - Continue Losartan 50mg QD.    #HLD (hyperlipidemia).   Follows regularly w PCP, takes rosuvastatin at home  - continue Atorvastatin 20mg QD  - initiate Fenofibrate as above.    #Anxiety.   Pt prescribed valium, of which he reports taking 2.5mg on rare occasions when unable to sleep  - prn melatonin for insomnia while inpatient.        Patient was discharged to: Home    New medications: Fenofibrate  Changes to old medications: None  Medications that were stopped: None    Items to follow up as outpatient: Please follow up with PCP in 1 week.  Labs to be followed outpatient: Triglycerides, liver enzymes    Exam to be followed outpatient: Abdominal distension    Physical exam at the time of discharge:  GENERAL: +Appears in discomfort/distress 2/2 pain, well-groomed, well-developed, sitting in chair beside bed.  HEAD:  Atraumatic, Normocephalic  EYES: conjunctiva and sclera clear  ENMT: Moist mucous membranes  NECK: Supple  NERVOUS SYSTEM:  Alert & Oriented X3, Good concentration  CHEST/LUNG: Clear to percussion bilaterally; No rales, rhonchi, wheezing, or rubs  HEART: Regular rate and rhythm; No murmurs, rubs, or gallops  ABDOMEN: Soft, Nontender, +Distended; Bowel sounds present  EXTREMITIES:  2+ Peripheral Pulses, No clubbing, cyanosis, or edema  SKIN: No rashes or lesions                 #Discharge: do not delete    48M w PMHx alcohol-induced pancreatitis, hypothyroidism, HTN, HLD, and anxiety, presenting with complaint of epigastric pain x2 days, admitted for management of alcoholic pancreatitis.    Hospital course (by problem):     #Acute pancreatitis.   Likely 2/2 chronic etoh consumption (reports 4 glasses of wine 5 days/week) and hypertriglyceridemia given Triglycerides= 1660, TG now downtrending to 660>442. Lipase >375. [place]'s criteria: 0. CT A/P revealed Acute pancreatitis with Possible small early necrotic change at the junction of body and tail. Placed on continuous fluids (1L NS, 100cc/hr). Pain regimen ordered. Switched from IV Morphine 4mg Q4 PRN to PO Oxycodone 5mg Q4 PRN with IV Morphine 2mg Q6 for breakthrough pain. General surgery consulted regarding necrotic changes visualized on CT A/P and decreased bowel motility on initial admission, however per recs no surgical intervention at this time and strict I&Os ordered. Diet advanced as tolerated to clear liquids, full liquids, then regular with low fat/consistent carb/Ensure Max 1 can TID. Endocrine consulted regarding hypertriglyceridemia, per recs started Fenofibrate 145mg QD.    #Hyponatremia.   Resolved. 134>135>137  Likely etiology is SIADH secondary to pain, consistent with urine lytes.  -Continue to monitor CBC.    #Hypocalcemia.   Resolved. Likely a complication of acute pancreatitis. CT A/P reveal Possible small early necrotic change at the junction of body and tail. 8.5>7.7>8.2>8.1>8.5>8.3>8.8.   -Continue to monitor Ca+2.    #Leukocytosis.   Downtrending to 11.38>11.12. Likely Reactive 2/2 given acute pancreatitis.  -Continue to monitor CBC.    #Fatty liver.   Likely 2/2 Alcohol use. CT A/P revealed hepatosteatosis. FIB-4: 1.25, therefore Low fibrosis score and is not appropriate candidate that requires liver biopsy. AST upretrending 32>25>42.  - continue to monitor AST/ALTs  - offer alcohol use cessation counseling (see below).    #Dysfunctional alcohol use.   Pt endorses long hx of alcohol consumption, currently reports consuming moderate amounts of wine multiple days per week. quit drinking x1 year but resumed etoh consumption during the pandemic. ed JOSE <3, CIWA score 0. Pt offered referrals to counseling services, as he acknowledges his etoh consumption is problematic, plans to stop drinking & states he feels he is equipped to do so on his own, as he has done in the past. Pt is not currently interested in referrals to counseling services. encouraged to reach out for services as desired  ketonuria likely from acute alcohol consumption versus prolonged starvation.  -CIWA protocol.    #Hypothyroidism.    PMHx hyperthyroidism, s/p total thyroidectomy for malignant nodule, takes synthroid 150mcg every morning except sundays, on which days pt reports he takes 300mcg. TSH: 3.020, wnl.  - continue synthroid 150mcg qd.    #HTN (hypertension).   Follows regularly w PCP (Dr. La), home medications include propanolol 20mg qhs, losartan (unsure of dose)  - Continue Losartan 50mg QD.    #HLD (hyperlipidemia).   Follows regularly w PCP, takes rosuvastatin at home  - continue Atorvastatin 20mg QD  - initiate Fenofibrate as above.    #Anxiety.   Pt prescribed valium, of which he reports taking 2.5mg on rare occasions when unable to sleep  - prn melatonin for insomnia while inpatient.        Patient was discharged to: Home    New medications: Fenofibrate  Changes to old medications: Rosuvastatin, Lisinopril  Medications that were stopped: None    Items to follow up as outpatient: Please follow up with PCP in 1 week.  Labs to be followed outpatient: Triglycerides, liver enzymes    Exam to be followed outpatient: Abdominal distension    Physical exam at the time of discharge:  GENERAL: +Appears in discomfort/distress 2/2 pain, well-groomed, well-developed, sitting in chair beside bed.  HEAD:  Atraumatic, Normocephalic  EYES: conjunctiva and sclera clear  ENMT: Moist mucous membranes  NECK: Supple  NERVOUS SYSTEM:  Alert & Oriented X3, Good concentration  CHEST/LUNG: Clear to percussion bilaterally; No rales, rhonchi, wheezing, or rubs  HEART: Regular rate and rhythm; No murmurs, rubs, or gallops  ABDOMEN: Soft, Nontender, +Distended; Bowel sounds present  EXTREMITIES:  2+ Peripheral Pulses, No clubbing, cyanosis, or edema  SKIN: No rashes or lesions                 #Discharge: do not delete    48M w PMHx alcohol-induced pancreatitis, hypothyroidism, HTN, HLD, and anxiety, presenting with complaint of epigastric pain x2 days, admitted for management of alcoholic pancreatitis.    Hospital course (by problem):     #Acute pancreatitis.   Likely 2/2 chronic etoh consumption (reports 4 glasses of wine 5 days/week) and hypertriglyceridemia given Triglycerides= 1660, TG now downtrending to 660>442. Lipase >375. [place]'s criteria: 0. CT A/P revealed Acute pancreatitis with Possible small early necrotic change at the junction of body and tail. Placed on continuous fluids (1L NS, 100cc/hr). Pain regimen ordered. Switched from IV Morphine 4mg Q4 PRN to PO Oxycodone 5mg Q4 PRN with IV Morphine 2mg Q6 for breakthrough pain. General surgery consulted regarding necrotic changes visualized on CT A/P and decreased bowel motility on initial admission, however per recs no surgical intervention at this time and strict I&Os ordered. Diet advanced as tolerated to clear liquids, full liquids, then regular with low fat/consistent carb/Ensure Max 1 can TID. Endocrine consulted regarding hypertriglyceridemia, per recs started Fenofibrate 145mg QD.    #Hyponatremia.   Resolved. 134>135>137  Likely etiology is SIADH secondary to pain, consistent with urine lytes.  -Continue to monitor CBC.    #Hypocalcemia.   Resolved. Likely a complication of acute pancreatitis. CT A/P reveal Possible small early necrotic change at the junction of body and tail. 8.5>7.7>8.2>8.1>8.5>8.3>8.8.   -Continue to monitor Ca+2.    #Leukocytosis.   Downtrending to 11.38>11.12. Likely Reactive 2/2 given acute pancreatitis.  -Continue to monitor CBC.    #Fatty liver.   Likely 2/2 Alcohol use. CT A/P revealed hepatosteatosis. FIB-4: 1.25, therefore Low fibrosis score and is not appropriate candidate that requires liver biopsy. AST upretrending 32>25>42.  - continue to monitor AST/ALTs  - offer alcohol use cessation counseling (see below).    #Dysfunctional alcohol use.   Pt endorses long hx of alcohol consumption, currently reports consuming moderate amounts of wine multiple days per week. quit drinking x1 year but resumed etoh consumption during the pandemic. ed JOSE <3, CIWA score 0. Pt offered referrals to counseling services, as he acknowledges his etoh consumption is problematic, plans to stop drinking & states he feels he is equipped to do so on his own, as he has done in the past. Pt is not currently interested in referrals to counseling services. encouraged to reach out for services as desired  ketonuria likely from acute alcohol consumption versus prolonged starvation.  -CIWA protocol.    #Hypothyroidism.    PMHx hyperthyroidism, s/p total thyroidectomy for malignant nodule, takes synthroid 150mcg every morning except sundays, on which days pt reports he takes 300mcg. TSH: 3.020, wnl.  - continue synthroid 150mcg qd.    #HTN (hypertension).   Follows regularly w PCP (Dr. La), home medications include propanolol 20mg qhs, losartan (unsure of dose)  - Continue Losartan 50mg QD.    #HLD (hyperlipidemia).   Follows regularly w PCP, takes rosuvastatin at home  - continue Atorvastatin 20mg QD  - initiate Fenofibrate as above.    #Anxiety.   Pt prescribed valium, of which he reports taking 2.5mg on rare occasions when unable to sleep  - prn melatonin for insomnia while inpatient.        Patient was discharged to: Home    New medications: Fenofibrate  Changes to old medications: Rosuvastatin, Lisinopril  Medications that were stopped: None    Items to follow up as outpatient: Please follow up with PCP in 1 week.  Labs to be followed outpatient: Triglycerides, liver enzymes    Exam to be followed outpatient: Abdominal distension    Physical exam at the time of discharge:  GENERAL: +Appears in discomfort/distress 2/2 pain, well-groomed, well-developed, sitting in chair beside bed.  HEAD:  Atraumatic, Normocephalic  EYES: conjunctiva and sclera clear  ENMT: Moist mucous membranes  NECK: Supple  NERVOUS SYSTEM:  Alert & Oriented X3, Good concentration  CHEST/LUNG: Clear to percussion bilaterally; No rales, rhonchi, wheezing, or rubs  HEART: Regular rate and rhythm; No murmurs, rubs, or gallops  ABDOMEN: Soft, Nontender, +Distended; Bowel sounds present  EXTREMITIES:  2+ Peripheral Pulses, No clubbing, cyanosis, or edema  SKIN: No rashes or lesions                 #Discharge: do not delete    48M w PMHx alcohol-induced pancreatitis, hypothyroidism, HTN, HLD, and anxiety, presenting with complaint of epigastric pain x2 days, admitted for management of alcoholic pancreatitis.    Hospital course (by problem):     #Acute pancreatitis.   Likely 2/2 chronic etoh consumption (reports 4 glasses of wine 5 days/week) and hypertriglyceridemia given Triglycerides= 1660, TG now downtrending to 660>442. Lipase >375. [place]'s criteria: 0. CT A/P revealed Acute pancreatitis with Possible small early necrotic change at the junction of body and tail. Placed on continuous fluids (1L NS, 100cc/hr). Pain regimen ordered. Switched from IV Morphine 4mg Q4 PRN to PO Oxycodone 5mg Q4 PRN with IV Morphine 2mg Q6 for breakthrough pain. General surgery consulted regarding necrotic changes visualized on CT A/P and decreased bowel motility on initial admission, however per recs no surgical intervention at this time and strict I&Os ordered. Diet advanced as tolerated to clear liquids, full liquids, then regular with low fat/consistent carb/Ensure Max 1 can TID. Endocrine consulted regarding hypertriglyceridemia, per recs started Fenofibrate 145mg QD.    #Hyponatremia.   Resolved. 134>135>137  Likely etiology is SIADH secondary to pain, consistent with urine lytes.  -Continue to monitor CBC.    #Hypocalcemia.   Resolved. Likely a complication of acute pancreatitis. CT A/P reveal Possible small early necrotic change at the junction of body and tail. 8.5>7.7>8.2>8.1>8.5>8.3>8.8.   -Continue to monitor Ca+2.    #Leukocytosis.   Downtrending to 11.38>11.12. Likely Reactive 2/2 given acute pancreatitis.  -Continue to monitor CBC.    #Fatty liver.   Likely 2/2 Alcohol use. CT A/P revealed hepatosteatosis. FIB-4: 1.25, therefore Low fibrosis score and is not appropriate candidate that requires liver biopsy. AST upretrending 32>25>42.  - continue to monitor AST/ALTs  - offer alcohol use cessation counseling (see below).    #Dysfunctional alcohol use.   Pt endorses long hx of alcohol consumption, currently reports consuming moderate amounts of wine multiple days per week. quit drinking x1 year but resumed etoh consumption during the pandemic. ed JOSE <3, CIWA score 0. Pt offered referrals to counseling services, as he acknowledges his etoh consumption is problematic, plans to stop drinking & states he feels he is equipped to do so on his own, as he has done in the past. Pt is not currently interested in referrals to counseling services. encouraged to reach out for services as desired  ketonuria likely from acute alcohol consumption versus prolonged starvation.  -CIWA protocol.    #Hypothyroidism.    PMHx hyperthyroidism, s/p total thyroidectomy for malignant nodule, takes synthroid 150mcg every morning except sundays, on which days pt reports he takes 300mcg. TSH: 3.020, wnl.  - continue synthroid 150mcg qd.    #HTN (hypertension).   Follows regularly w PCP (Dr. La), home medications include propanolol 20mg qhs, losartan (unsure of dose)  - Continue Losartan 50mg QD.    #HLD (hyperlipidemia).   Follows regularly w PCP, takes rosuvastatin at home  - continue Atorvastatin 20mg QD  - initiate Fenofibrate as above.    #Anxiety.   Pt prescribed valium, of which he reports taking 2.5mg on rare occasions when unable to sleep  - prn melatonin for insomnia while inpatient.        Patient was discharged to: Home    New medications: Fenofibrate, Atorvastatin  Changes to old medications: Losartan  Medications that were stopped: Rosuvastatin    Items to follow up as outpatient: Please follow up with PCP in 1 week.  Labs to be followed outpatient: Triglycerides, liver enzymes    Exam to be followed outpatient: Abdominal distension    Physical exam at the time of discharge:  GENERAL: +Appears in discomfort/distress 2/2 pain, well-groomed, well-developed, sitting in chair beside bed.  HEAD:  Atraumatic, Normocephalic  EYES: conjunctiva and sclera clear  ENMT: Moist mucous membranes  NECK: Supple  NERVOUS SYSTEM:  Alert & Oriented X3, Good concentration  CHEST/LUNG: Clear to percussion bilaterally; No rales, rhonchi, wheezing, or rubs  HEART: Regular rate and rhythm; No murmurs, rubs, or gallops  ABDOMEN: Soft, Nontender, +Distended; Bowel sounds present  EXTREMITIES:  2+ Peripheral Pulses, No clubbing, cyanosis, or edema  SKIN: No rashes or lesions                 #Discharge: do not delete    48M w PMHx alcohol-induced pancreatitis, hypothyroidism, HTN, HLD, and anxiety, presenting with complaint of epigastric pain x2 days, admitted for management of alcoholic pancreatitis.    Hospital course (by problem):     #Acute pancreatitis.   Likely 2/2 chronic etoh consumption (reports 4 glasses of wine 5 days/week) and hypertriglyceridemia given Triglycerides= 1660, TG now downtrending to 660>442. Lipase >375. [place]'s criteria: 0. CT A/P revealed Acute pancreatitis with Possible small early necrotic change at the junction of body and tail. Placed on continuous fluids (1L NS, 100cc/hr). Pain regimen ordered. Switched from IV Morphine 4mg Q4 PRN to PO Oxycodone 5mg Q4 PRN with IV Morphine 2mg Q6 for breakthrough pain. General surgery consulted regarding necrotic changes visualized on CT A/P and decreased bowel motility on initial admission, however per recs no surgical intervention at this time and strict I&Os ordered. Diet advanced as tolerated to clear liquids, full liquids, then regular with low fat/consistent carb/Ensure Max 1 can TID. Endocrine consulted regarding hypertriglyceridemia, per recs started Fenofibrate 145mg QD.    #Hyponatremia.   Resolved. 134>135>137  Likely etiology is SIADH secondary to pain, consistent with urine lytes.  -Continue to monitor CBC.    #Hypocalcemia.   Resolved. Likely a complication of acute pancreatitis. CT A/P reveal Possible small early necrotic change at the junction of body and tail. 8.5>7.7>8.2>8.1>8.5>8.3>8.8.   -Continue to monitor Ca+2.    #Leukocytosis.   Downtrending to 11.38>11.12. Likely Reactive 2/2 given acute pancreatitis.  -Continue to monitor CBC.    #Fatty liver.   Likely 2/2 Alcohol use. CT A/P revealed hepatosteatosis. FIB-4: 1.25, therefore Low fibrosis score and is not appropriate candidate that requires liver biopsy. AST upretrending 32>25>42.  - continue to monitor AST/ALTs  - offer alcohol use cessation counseling (see below).    #Dysfunctional alcohol use.   Pt endorses long hx of alcohol consumption, currently reports consuming moderate amounts of wine multiple days per week. quit drinking x1 year but resumed etoh consumption during the pandemic. ed JOSE <3, CIWA score 0. Pt offered referrals to counseling services, as he acknowledges his etoh consumption is problematic, plans to stop drinking & states he feels he is equipped to do so on his own, as he has done in the past. Pt is not currently interested in referrals to counseling services. encouraged to reach out for services as desired  ketonuria likely from acute alcohol consumption versus prolonged starvation.  -CIWA protocol.    #Hypothyroidism.    PMHx hyperthyroidism, s/p total thyroidectomy for malignant nodule, takes synthroid 150mcg every morning except sundays, on which days pt reports he takes 300mcg. TSH: 3.020, wnl.  - continue synthroid 150mcg qd.    #HTN (hypertension).   Follows regularly w PCP (Dr. La), home medications include propanolol 20mg qhs, losartan (unsure of dose)  - Continue Losartan 50mg QD.    #HLD (hyperlipidemia).   Follows regularly w PCP, takes rosuvastatin at home  - continue Atorvastatin 20mg QD  - initiate Fenofibrate as above.    #Anxiety.   Pt prescribed valium, of which he reports taking 2.5mg on rare occasions when unable to sleep  - prn melatonin for insomnia while inpatient.        Patient was discharged to: Home    New medications: Fenofibrate, Atorvastatin  Changes to old medications: Losartan  Medications that were stopped: Rosuvastatin    Items to follow up as outpatient: Please follow up with PCP in 1 week.  Labs to be followed outpatient: Triglycerides, liver enzymes    Exam to be followed outpatient: Abdominal distension    Physical exam at the time of discharge:  GENERAL: +Appears in discomfort/distress 2/2 pain, well-groomed, well-developed, sitting in chair beside bed.  HEAD:  Atraumatic, Normocephalic  EYES: conjunctiva and sclera clear  ENMT: Moist mucous membranes  NECK: Supple  NERVOUS SYSTEM:  Alert & Oriented X3, Good concentration  CHEST/LUNG: Clear to percussion bilaterally; No rales, rhonchi, wheezing, or rubs  HEART: Regular rate and rhythm; No murmurs, rubs, or gallops  ABDOMEN: Soft, Nontender, +Distended; Bowel sounds present  EXTREMITIES:  2+ Peripheral Pulses, No clubbing, cyanosis, or edema  SKIN: No rashes or lesions                 #Discharge: do not delete    48M w PMHx alcohol-induced pancreatitis, hypothyroidism, HTN, HLD, and anxiety, presenting with complaint of epigastric pain x2 days, admitted for management of alcoholic pancreatitis.    Hospital course (by problem):     #Acute pancreatitis.   Likely 2/2 chronic etoh consumption (reports 4 glasses of wine 5 days/week) and hypertriglyceridemia given Triglycerides= 1660, TG now downtrending to 660>442. Lipase >375. [place]'s criteria: 0. CT A/P revealed Acute pancreatitis with Possible small early necrotic change at the junction of body and tail. Placed on continuous fluids (1L NS, 100cc/hr). Pain regimen ordered. Switched from IV Morphine 4mg Q4 PRN to PO Oxycodone 5mg Q4 PRN with IV Morphine 2mg Q6 for breakthrough pain. General surgery consulted regarding necrotic changes visualized on CT A/P and decreased bowel motility on initial admission, however per recs no surgical intervention at this time and strict I&Os ordered. Diet advanced as tolerated to clear liquids, full liquids, then regular with low fat/consistent carb/Ensure Max 1 can TID. Endocrine consulted regarding hypertriglyceridemia, per recs started Fenofibrate 145mg QD.    #Hyponatremia.   Resolved. 134>135>137  Likely etiology is SIADH secondary to pain, consistent with urine lytes.    #Hypocalcemia.   Resolved. Likely a complication of acute pancreatitis. CT A/P reveal Possible small early necrotic change at the junction of body and tail. 8.5>7.7>8.2>8.1>8.5>8.3>8.8.     #Leukocytosis.   Downtrending to 11.38>11.12. Likely Reactive 2/2 given acute pancreatitis.    #Fatty liver.   Likely 2/2 Alcohol use. CT A/P revealed hepatosteatosis. FIB-4: 1.25, therefore Low fibrosis score and is not appropriate candidate that requires liver biopsy. AST upretrending 32>25>42.  - offer alcohol use cessation counseling (see below).    #Dysfunctional alcohol use.   Pt endorses long hx of alcohol consumption, currently reports consuming moderate amounts of wine multiple days per week. quit drinking x1 year but resumed etoh consumption during the pandemic. ed JOSE <3, CIWA score 0. Pt offered referrals to counseling services, as he acknowledges his etoh consumption is problematic, plans to stop drinking & states he feels he is equipped to do so on his own, as he has done in the past. Pt is not currently interested in referrals to counseling services. encouraged to reach out for services as desired  ketonuria likely from acute alcohol consumption versus prolonged starvation.  -[facility] protocol.    #Hypothyroidism.    PMHx hyperthyroidism, s/p total thyroidectomy for malignant nodule, takes synthroid 150mcg every morning except sundays, on which days pt reports he takes 300mcg. TSH: 3.020, wnl.  - continue synthroid 150mcg qd.    #HTN (hypertension).   Follows regularly w PCP (Dr. La), home medications include propanolol 20mg qhs, losartan (unsure of dose)  - Continue Losartan 50mg QD.  - f/u PCP    #HLD (hyperlipidemia).   Follows regularly w PCP, takes rosuvastatin at home  - continue Atorvastatin 20mg QD  - initiate Fenofibrate as above.    #Anxiety.   Pt prescribed valium, of which he reports taking 2.5mg on rare occasions when unable to sleep  - prn melatonin for insomnia while inpatient.        Patient was discharged to: Home    New medications: Fenofibrate, Atorvastatin  Changes to old medications: Losartan increased  Medications that were stopped: Rosuvastatin    Items to follow up as outpatient: Please follow up with PCP in 1 week.  Labs to be followed outpatient: Triglycerides, liver enzymes    Exam to be followed outpatient: Abdominal distension    Physical exam at the time of discharge:  GENERAL: +Appears in discomfort/distress 2/2 pain, well-groomed, well-developed, sitting in chair beside bed.  HEAD:  Atraumatic, Normocephalic  EYES: conjunctiva and sclera clear  ENMT: Moist mucous membranes  NECK: Supple  NERVOUS SYSTEM:  Alert & Oriented X3, Good concentration  CHEST/LUNG: Clear to percussion bilaterally; No rales, rhonchi, wheezing, or rubs  HEART: Regular rate and rhythm; No murmurs, rubs, or gallops  ABDOMEN: Soft, Nontender, +Distended; Bowel sounds present  EXTREMITIES:  2+ Peripheral Pulses, No clubbing, cyanosis, or edema  SKIN: No rashes or lesions                 #Discharge: do not delete    48M w PMHx alcohol-induced pancreatitis, hypothyroidism, HTN, HLD, and anxiety, presenting with complaint of epigastric pain x2 days, admitted for management of alcoholic pancreatitis.    Hospital course (by problem):     #Acute pancreatitis.   Likely 2/2 chronic etoh consumption (reports 4 glasses of wine 5 days/week) and hypertriglyceridemia given Triglycerides= 1660, TG now downtrending to 660>442. Lipase >375. [place]'s criteria: 0. CT A/P revealed Acute pancreatitis with Possible small early necrotic change at the junction of body and tail. Placed on continuous fluids (1L NS, 100cc/hr). Pain regimen ordered. Switched from IV Morphine 4mg Q4 PRN to PO Oxycodone 5mg Q4 PRN with IV Morphine 2mg Q6 for breakthrough pain. General surgery consulted regarding necrotic changes visualized on CT A/P and decreased bowel motility on initial admission, however per recs no surgical intervention at this time and strict I&Os ordered. Diet advanced as tolerated to clear liquids, full liquids, then regular with low fat/consistent carb/Ensure Max 1 can TID. Endocrine consulted regarding hypertriglyceridemia, per recs started Fenofibrate 145mg QD.    #Hyponatremia.   Resolved. 134>135>137  Likely etiology is SIADH secondary to pain, consistent with urine lytes.    #Hypocalcemia.   Resolved. Likely a complication of acute pancreatitis. CT A/P reveal Possible small early necrotic change at the junction of body and tail. 8.5>7.7>8.2>8.1>8.5>8.3>8.8.     #Leukocytosis.   Downtrending to 11.38>11.12. Likely Reactive 2/2 given acute pancreatitis.    #Fatty liver.   Likely 2/2 Alcohol use. CT A/P revealed hepatosteatosis. FIB-4: 1.25, therefore Low fibrosis score and is not appropriate candidate that requires liver biopsy. AST upretrending 32>25>42.  - offer alcohol use cessation counseling (see below).    #Dysfunctional alcohol use.   Pt endorses long hx of alcohol consumption, currently reports consuming moderate amounts of wine multiple days per week. quit drinking x1 year but resumed etoh consumption during the pandemic. ed JOSE <3, CIWA score 0. Pt offered referrals to counseling services, as he acknowledges his etoh consumption is problematic, plans to stop drinking & states he feels he is equipped to do so on his own, as he has done in the past. Pt is not currently interested in referrals to counseling services. encouraged to reach out for services as desired  ketonuria likely from acute alcohol consumption versus prolonged starvation.  -[facility] protocol.    #Hypothyroidism.    PMHx hyperthyroidism, s/p total thyroidectomy for malignant nodule, takes synthroid 150mcg every morning except sundays, on which days pt reports he takes 300mcg. TSH: 3.020, wnl.  - continue synthroid 150mcg qd.    #HTN (hypertension).   Follows regularly w PCP (Dr. La), home medications include propanolol 20mg qhs, losartan (unsure of dose)  - Continue Losartan 50mg QD.  - f/u PCP    #HLD (hyperlipidemia).   Follows regularly w PCP, takes rosuvastatin at home  - continue Atorvastatin 20mg QD  - initiate Fenofibrate as above.    #Anxiety.   Pt prescribed valium, of which he reports taking 2.5mg on rare occasions when unable to sleep  - prn melatonin for insomnia while inpatient.        Patient was discharged to: Home    New medications: Fenofibrate, Atorvastatin  Changes to old medications: Losartan increased  Medications that were stopped: Rosuvastatin    Items to follow up as outpatient: Please follow up with PCP in 1 week.  Labs to be followed outpatient: Triglycerides, liver enzymes    Exam to be followed outpatient: Abdominal distension    Physical exam at the time of discharge:  GENERAL: +Appears in discomfort/distress 2/2 pain, well-groomed, well-developed, sitting in chair beside bed.  HEAD:  Atraumatic, Normocephalic  EYES: conjunctiva and sclera clear  ENMT: Moist mucous membranes  NECK: Supple  NERVOUS SYSTEM:  Alert & Oriented X3, Good concentration  CHEST/LUNG: Clear to percussion bilaterally; No rales, rhonchi, wheezing, or rubs  HEART: Regular rate and rhythm; No murmurs, rubs, or gallops  ABDOMEN: Soft, Nontender, +Distended; Bowel sounds present  EXTREMITIES:  2+ Peripheral Pulses, No clubbing, cyanosis, or edema  SKIN: No rashes or lesions

## 2023-12-19 NOTE — DIETITIAN INITIAL EVALUATION ADULT - PROBLEM SELECTOR PLAN 3
pt endorses long hx of alcohol consumption, currently repots consuming moderate amounts of wine multiple days per week. quit drinking x1 year but resumed etoh consumption during the pandemic. ed danette <3, ciwa score 0  pt offered referrals to counseling services, as he acknowledges his etoh consumption is problematic, plans to stop drinking & states he feels he is equipped to do so on his own, as he has done in the past. offered counseling resources/referrals but pt is not currently interested. encouraged to reach out for services as desired  ketonuria likely from acute alcohol consumption versus prolonged starvation  - consider thiamine, b12, folate supplementation  - pt's reported last drink several days ago puts him out of window of acute withdrawal, denies any hx of wd sx, will continue to monitor w low-risk ciwa protocol ordered x24h

## 2023-12-19 NOTE — DISCHARGE NOTE PROVIDER - NSDCFUADDAPPT_GEN_ALL_CORE_FT
Please follow up within PCP 1 week.  Please follow up with Primary care doctor in 1 week to monitor your pancreatitis, blood pressure and cholesterol.

## 2023-12-19 NOTE — DIETITIAN INITIAL EVALUATION ADULT - PERTINENT MEDS FT
MEDICATIONS  (STANDING):  acetaminophen     Tablet .. 650 milliGRAM(s) Oral every 6 hours  atorvastatin 20 milliGRAM(s) Oral at bedtime  dextrose 5%. 1000 milliLiter(s) (50 mL/Hr) IV Continuous <Continuous>  dextrose 5%. 1000 milliLiter(s) (100 mL/Hr) IV Continuous <Continuous>  dextrose 50% Injectable 12.5 Gram(s) IV Push once  dextrose 50% Injectable 25 Gram(s) IV Push once  dextrose 50% Injectable 25 Gram(s) IV Push once  enoxaparin Injectable 40 milliGRAM(s) SubCutaneous every 24 hours  fenofibrate Tablet 145 milliGRAM(s) Oral daily  glucagon  Injectable 1 milliGRAM(s) IntraMuscular once  influenza   Vaccine 0.5 milliLiter(s) IntraMuscular once  insulin lispro (ADMELOG) corrective regimen sliding scale   SubCutaneous three times a day before meals  lactated ringers. 1000 milliLiter(s) (100 mL/Hr) IV Continuous <Continuous>  lactated ringers. 1000 milliLiter(s) (100 mL/Hr) IV Continuous <Continuous>  levothyroxine 150 MICROGram(s) Oral daily  losartan 50 milliGRAM(s) Oral with breakfast  polyethylene glycol 3350 17 Gram(s) Oral every 12 hours  senna 2 Tablet(s) Oral at bedtime    MEDICATIONS  (PRN):  dextrose Oral Gel 15 Gram(s) Oral once PRN Blood Glucose LESS THAN 70 milliGRAM(s)/deciliter  melatonin 3 milliGRAM(s) Oral at bedtime PRN Insomnia  morphine  - Injectable 2 milliGRAM(s) IV Push every 6 hours PRN breakthrough pain  oxyCODONE    IR 5 milliGRAM(s) Oral every 4 hours PRN Moderate Pain (4 - 6)

## 2023-12-19 NOTE — DIETITIAN INITIAL EVALUATION ADULT - PROBLEM SELECTOR PLAN 1
Pt p/w epigastric pain radiating to back, attributes to past episodes of pancreatitis, Lipase >375. Likely 2/2 chronic etoh consumption (reports 3-4 glasses of wine multiple days/week), states he had stopped drinking for 1 year following initial dx w pancreatitis (for which pt was hospitalized at [facility] for 10 days), but began to drink again during the pandemic.   - iv morphine 14h prn  - 0.9% NS at 10cc/kg/h  - monitor Hct (goal  35-44%)  -monitor BUN (increase IVF if BUN remains the same or increases)  - follow up repeat CBC, CMP, Triglycerides, Lactate  - clear liquid diet, advance as tolerated  - Calculate [place]'s Criteria (pending repeat CMP, aminotransferase sample lipemic) Pt p/w epigastric pain radiating to back, attributes to past episodes of pancreatitis, Lipase >375. Likely 2/2 chronic etoh consumption (reports 3-4 glasses of wine multiple days/week), states he had stopped drinking for 1 year following initial dx w pancreatitis (for which pt was hospitalized at [facility] for 10 days), but began to drink again during the pandemic.   - iv morphine 14h prn  - 0.9% NS at 10cc/kg/h  - monitor Hct (goal  35-44%)  -monitor BUN (increase IVF if BUN remains the same or increases)  - follow up repeat CBC, CMP, Triglycerides, Lactate  - clear liquid diet, advance as tolerated  - Calculate [place]'s Criteria (pending repeat CMP, aminotransferase sample lipemic)

## 2023-12-19 NOTE — DISCHARGE NOTE PROVIDER - NSDCCPCAREPLAN_GEN_ALL_CORE_FT
PRINCIPAL DISCHARGE DIAGNOSIS  Diagnosis: Acute pancreatitis  Assessment and Plan of Treatment: Pancreatitis is inflammation of the pancreas. The disorder is caused by many reasons, but in your case it is due to excessive intake of alcohol and high fats. Inflammation is caused by the escape of pancreatic enzymes into the tissues of the pancreas. These digestive juices cause irritation, with collection of fluid and congestion of the blood vessels. Sometimes, some of the tissue can become highly damaged and later form scar tissue.   Please see your PCP within 1 week after discharge to discuss your recovery, alcohol use, and to monitor your fats.     PRINCIPAL DISCHARGE DIAGNOSIS  Diagnosis: Acute pancreatitis  Assessment and Plan of Treatment: Pancreatitis is inflammation of the pancreas. The disorder is caused by many reasons, but in your case it is due to excessive intake of alcohol and high fats. Inflammation is caused by the escape of pancreatic enzymes into the tissues of the pancreas. These digestive juices cause irritation, with collection of fluid and congestion of the blood vessels. Sometimes, some of the tissue can become highly damaged and later form scar tissue.   Please see your PCP within 1 week after discharge to discuss your recovery, alcohol use, and to monitor your fats.      SECONDARY DISCHARGE DIAGNOSES  Diagnosis: High blood triglycerides  Assessment and Plan of Treatment: You had very high trglycerides in your blood. Sometimes this can also be a risk factor for pancreatitis. You were seen by the Endocrinology team who recommended taking cholesterol and triglyceride lowering medications.  - c/w lipitor and fenofibrate  - follow up with your primary care doctor     PRINCIPAL DISCHARGE DIAGNOSIS  Diagnosis: Acute pancreatitis  Assessment and Plan of Treatment: Pancreatitis is inflammation of the pancreas. The disorder is caused by many reasons, but in your case it is due to excessive intake of alcohol and high fats. Inflammation is caused by the escape of pancreatic enzymes into the tissues of the pancreas. These digestive juices cause irritation, with collection of fluid and congestion of the blood vessels. Sometimes, some of the tissue can become highly damaged and later form scar tissue.   Please see your PCP within 1 week after discharge to discuss your recovery, alcohol use, and to monitor your fats.      SECONDARY DISCHARGE DIAGNOSES  Diagnosis: High blood triglycerides  Assessment and Plan of Treatment: You had very high triglycerides in your blood. Sometimes this can also be a risk factor for pancreatitis. You were seen by the Endocrinology team who recommended taking cholesterol and triglyceride lowering medications.  - c/w lipitor and fenofibrate  - follow up with your primary care doctor    Diagnosis: Fatty liver  Assessment and Plan of Treatment: Alcoholic fatty liver disease is due to heavy alcohol use. Your liver breaks down most of the alcohol you drink, so it can be removed from your body. But the process of breaking it down can generate harmful substances. These substances can damage liver cells, promote inflammation, and weaken your body's natural defenses. The more alcohol that you drink, the more you damage your liver. Alcoholic fatty liver disease is the earliest stage of alcohol-related liver disease. The next stages are alcoholic hepatitis and cirrhosis.

## 2023-12-19 NOTE — DIETITIAN INITIAL EVALUATION ADULT - PERTINENT LABORATORY DATA
12-19    137  |  103  |  7   ----------------------------<  123<H>  4.1   |  23  |  0.84    Ca    8.8      19 Dec 2023 05:30  Phos  2.9     12-19  Mg     2.2     12-19    POCT Blood Glucose.: 110 mg/dL (12-19-23 @ 10:02)  A1C with Estimated Average Glucose Result: 5.9 % (12-18-23 @ 05:30)

## 2023-12-19 NOTE — DIETITIAN INITIAL EVALUATION ADULT - ADD RECOMMEND
1. Advance diet as tolerated. Recommend low fat diet when cleared for solid foods. Consider adding Ensure Max Protein 1x/day (150 kcal, 30 g protein per serving) if PO intake declines.  2. Encourage pt to meet nutritional needs as able   3. Monitor labs: electrolytes, cmp  4. Monitor weights   5. Pain and bowel regimen per team   6. Will continue to assess/honor food preferences as able  7. Monitor adherence to diet education

## 2023-12-19 NOTE — PROGRESS NOTE ADULT - ASSESSMENT
47yo M PMHx HTN, HLD, anxiety, hypothyroidism s/p total thyroidectomy for thyroid cancer (2015), ETOH abuse, multiple prior hospitalizations for EtOH pancreatitis in the past (on approx 2 additional occasions), who presents with epigastric pain x2 days, found to have a recurrent episode of alcoholic pancreatitis. General surgery consulted for further evaluation of necrotic collection noted on CT. Currently, patient reports feeling progressively distended and reports not passing flatus or having BMs since Thursday. Patient has been taking morphine RTC since admission, contributing as well. Dx c/w acute pancreatitis with 3.5cm area of early necrotic change vs focal edema, likely a component of ileus secondary to pancreatitis and opioid use.   Now having bowel function but persistent abdominal pain and bloating   Improving hypertriglyceridemia - 660 from 1660   Acute pancreatitis secondary to EtOH vs hypertriglyceridemia     Recommendations:  - No acute surgical intervention needed at this time   - Optimize pain control   - Recommend aggressive resuscitation - keep fluids at 120cc/hr   - PLEASE ENSURE STRICT I/O's   - Advance diet slowly or step down if pain remains uncontrolled   - Consider repeat CT in 5 days from last CT or earlier if clinical status deteriorates   - Team 1C will continue to follow, please call with any questions or concerns 49yo M PMHx HTN, HLD, anxiety, hypothyroidism s/p total thyroidectomy for thyroid cancer (2015), ETOH abuse, multiple prior hospitalizations for EtOH pancreatitis in the past (on approx 2 additional occasions), who presents with epigastric pain x2 days, found to have a recurrent episode of alcoholic pancreatitis. General surgery consulted for further evaluation of necrotic collection noted on CT. Currently, patient reports feeling progressively distended and reports not passing flatus or having BMs since Thursday. Patient has been taking morphine RTC since admission, contributing as well. Dx c/w acute pancreatitis with 3.5cm area of early necrotic change vs focal edema, likely a component of ileus secondary to pancreatitis and opioid use.   Now having bowel function but persistent abdominal pain and bloating   Improving hypertriglyceridemia - 660 from 1660   Acute pancreatitis secondary to EtOH vs hypertriglyceridemia     Recommendations:  - No acute surgical intervention needed at this time   - Optimize pain control   - Recommend aggressive resuscitation  - PLEASE ENSURE STRICT I/O's   - Advance diet slowly or step down if pain remains uncontrolled   - Consider repeat CT in 5 days from last CT or earlier if clinical status deteriorates   - Team 1C will continue to follow, please call with any questions or concerns     D/w chief resident and attending  47yo M PMHx HTN, HLD, anxiety, hypothyroidism s/p total thyroidectomy for thyroid cancer (2015), ETOH abuse, multiple prior hospitalizations for EtOH pancreatitis in the past (on approx 2 additional occasions), who presents with epigastric pain x2 days, found to have a recurrent episode of alcoholic pancreatitis. General surgery consulted for further evaluation of necrotic collection noted on CT. Currently, patient reports feeling progressively distended and reports not passing flatus or having BMs since Thursday. Patient has been taking morphine RTC since admission, contributing as well. Dx c/w acute pancreatitis with 3.5cm area of early necrotic change vs focal edema, likely a component of ileus secondary to pancreatitis and opioid use.   Now having bowel function but persistent abdominal pain and bloating   Improving hypertriglyceridemia - 660 from 1660   Acute pancreatitis secondary to EtOH vs hypertriglyceridemia     Recommendations:  - No acute surgical intervention needed at this time   - Optimize pain control   - Recommend aggressive resuscitation  - PLEASE ENSURE STRICT I/O's   - Advance diet slowly or step down if pain remains uncontrolled   - Consider repeat CT in 5 days from last CT or earlier if clinical status deteriorates   - Team 1C will continue to follow, please call with any questions or concerns     D/w chief resident and attending

## 2023-12-19 NOTE — PROGRESS NOTE ADULT - PROVIDER SPECIALTY LIST ADULT
Endocrinology
Internal Medicine
Surgery
Endocrinology
Surgery
Surgery
Internal Medicine
Internal Medicine
Hospitalist

## 2023-12-19 NOTE — PROGRESS NOTE ADULT - PROBLEM SELECTOR PLAN 1
-Pt p/w epigastric pain radiating to back, attributes to past episodes of pancreatitis, Lipase >375.   -Likely 2/2 chronic etoh consumption (reports 4 glasses of wine 5 days/week) and hypertriglyceridemia given Triglycerides= 1660, TG now decreased to 660  -Tien's criteria: 0  -CT A/P: Acute pancreatitis. Possible small early necrotic change at the junction of body and tail.    PLAN:  - Continue 1L NS 100cc/hr x18 hrs  - Pain regimen:     ~Tylenol 650mg Q6     ~Switch from IV Morphine 4mg Q4 PRN to PO Oxycodone 5mg Q4 PRN      ~IV Morphine 2mg Q6 for breakthrough pain  - General surgery consulted re: necrotic changes and decreased bowel motility, per recs;     ~no surgical intervention at this time.     ~monitor strict I&Os.  - monitor Hct (goal 35-44%)  - monitor BUN (increase IVF if BUN remains the same or increases)  - Advance to Regular diet today  - Endocrine consult, per recs start Fenofibrate 145mg QD

## 2023-12-19 NOTE — PROGRESS NOTE ADULT - SUBJECTIVE AND OBJECTIVE BOX
SUBJECTIVE:      MEDICATIONS  (STANDING):  acetaminophen     Tablet .. 650 milliGRAM(s) Oral every 6 hours  atorvastatin 20 milliGRAM(s) Oral at bedtime  dextrose 5%. 1000 milliLiter(s) (50 mL/Hr) IV Continuous <Continuous>  dextrose 5%. 1000 milliLiter(s) (100 mL/Hr) IV Continuous <Continuous>  dextrose 50% Injectable 25 Gram(s) IV Push once  dextrose 50% Injectable 25 Gram(s) IV Push once  dextrose 50% Injectable 12.5 Gram(s) IV Push once  enoxaparin Injectable 40 milliGRAM(s) SubCutaneous every 24 hours  fenofibrate Tablet 145 milliGRAM(s) Oral every 24 hours  fenofibrate Tablet 145 milliGRAM(s) Oral daily  glucagon  Injectable 1 milliGRAM(s) IntraMuscular once  influenza   Vaccine 0.5 milliLiter(s) IntraMuscular once  insulin lispro (ADMELOG) corrective regimen sliding scale   SubCutaneous three times a day before meals  lactated ringers. 1000 milliLiter(s) (100 mL/Hr) IV Continuous <Continuous>  levothyroxine 150 MICROGram(s) Oral daily  losartan 50 milliGRAM(s) Oral with breakfast  polyethylene glycol 3350 17 Gram(s) Oral every 12 hours  senna 2 Tablet(s) Oral at bedtime    MEDICATIONS  (PRN):  dextrose Oral Gel 15 Gram(s) Oral once PRN Blood Glucose LESS THAN 70 milliGRAM(s)/deciliter  melatonin 3 milliGRAM(s) Oral at bedtime PRN Insomnia  morphine  - Injectable 2 milliGRAM(s) IV Push every 6 hours PRN breakthrough pain  oxyCODONE    IR 5 milliGRAM(s) Oral every 4 hours PRN Moderate Pain (4 - 6)      Vital Signs Last 24 Hrs  T(C): 36.5 (19 Dec 2023 05:32), Max: 37 (18 Dec 2023 09:45)  T(F): 97.7 (19 Dec 2023 05:32), Max: 98.6 (18 Dec 2023 09:45)  HR: 82 (19 Dec 2023 05:32) (81 - 89)  BP: 144/91 (19 Dec 2023 05:32) (122/78 - 145/94)  BP(mean): --  RR: 18 (19 Dec 2023 05:32) (18 - 19)  SpO2: 94% (19 Dec 2023 05:32) (93% - 95%)    Parameters below as of 19 Dec 2023 04:26  Patient On (Oxygen Delivery Method): room air      Physical Exam:  General: NAD, OOB to chair  Pulmonary: Nonlabored breathing, no respiratory distress  Cardiovascular: NSR  Abdominal: soft, mildly tender diffusely, moderately distended   Extremities: WWP, normal strength  Neuro: A/O x 3, CNs II-XII grossly intact, no focal deficits      I&O's Summary    18 Dec 2023 07:01  -  19 Dec 2023 06:22  --------------------------------------------------------  IN: 0 mL / OUT: 700 mL / NET: -700 mL        LABS:                        11.2   11.38 )-----------( 210      ( 18 Dec 2023 05:30 )             35.5     12-18    135  |  99  |  8   ----------------------------<  124<H>  3.4<L>   |  25  |  0.78    Ca    8.3<L>      18 Dec 2023 05:30  Phos  2.1     12-18  Mg     2.5     12-18        Urinalysis Basic - ( 18 Dec 2023 05:30 )    Color: x / Appearance: x / SG: x / pH: x  Gluc: 124 mg/dL / Ketone: x  / Bili: x / Urobili: x   Blood: x / Protein: x / Nitrite: x   Leuk Esterase: x / RBC: x / WBC x   Sq Epi: x / Non Sq Epi: x / Bacteria: x      CAPILLARY BLOOD GLUCOSE            RADIOLOGY & ADDITIONAL STUDIES:   SUBJECTIVE:   Patient seen and examined at bedside this AM   Pain well controlled with medications but barely improving as patient requiring round the clock meds to be comfortable   No nausea, emesis or chills   Voiding spontaneously   Passing flatus and having loose bowel movements       MEDICATIONS  (STANDING):  acetaminophen     Tablet .. 650 milliGRAM(s) Oral every 6 hours  atorvastatin 20 milliGRAM(s) Oral at bedtime  dextrose 5%. 1000 milliLiter(s) (50 mL/Hr) IV Continuous <Continuous>  dextrose 5%. 1000 milliLiter(s) (100 mL/Hr) IV Continuous <Continuous>  dextrose 50% Injectable 25 Gram(s) IV Push once  dextrose 50% Injectable 25 Gram(s) IV Push once  dextrose 50% Injectable 12.5 Gram(s) IV Push once  enoxaparin Injectable 40 milliGRAM(s) SubCutaneous every 24 hours  fenofibrate Tablet 145 milliGRAM(s) Oral every 24 hours  fenofibrate Tablet 145 milliGRAM(s) Oral daily  glucagon  Injectable 1 milliGRAM(s) IntraMuscular once  influenza   Vaccine 0.5 milliLiter(s) IntraMuscular once  insulin lispro (ADMELOG) corrective regimen sliding scale   SubCutaneous three times a day before meals  lactated ringers. 1000 milliLiter(s) (100 mL/Hr) IV Continuous <Continuous>  levothyroxine 150 MICROGram(s) Oral daily  losartan 50 milliGRAM(s) Oral with breakfast  polyethylene glycol 3350 17 Gram(s) Oral every 12 hours  senna 2 Tablet(s) Oral at bedtime    MEDICATIONS  (PRN):  dextrose Oral Gel 15 Gram(s) Oral once PRN Blood Glucose LESS THAN 70 milliGRAM(s)/deciliter  melatonin 3 milliGRAM(s) Oral at bedtime PRN Insomnia  morphine  - Injectable 2 milliGRAM(s) IV Push every 6 hours PRN breakthrough pain  oxyCODONE    IR 5 milliGRAM(s) Oral every 4 hours PRN Moderate Pain (4 - 6)      Vital Signs Last 24 Hrs  T(C): 36.5 (19 Dec 2023 05:32), Max: 37 (18 Dec 2023 09:45)  T(F): 97.7 (19 Dec 2023 05:32), Max: 98.6 (18 Dec 2023 09:45)  HR: 82 (19 Dec 2023 05:32) (81 - 89)  BP: 144/91 (19 Dec 2023 05:32) (122/78 - 145/94)  BP(mean): --  RR: 18 (19 Dec 2023 05:32) (18 - 19)  SpO2: 94% (19 Dec 2023 05:32) (93% - 95%)    Parameters below as of 19 Dec 2023 04:26  Patient On (Oxygen Delivery Method): room air      Physical Exam:  General: NAD, OOB to chair  Pulmonary: Nonlabored breathing, no respiratory distress  Cardiovascular: NSR  Abdominal: soft, mildly tender diffusely, moderately distended   Extremities: WWP, normal strength  Neuro: A/O x 3, CNs II-XII grossly intact, no focal deficits      I&O's Summary    18 Dec 2023 07:01  -  19 Dec 2023 06:22  --------------------------------------------------------  IN: 0 mL / OUT: 700 mL / NET: -700 mL        LABS:                        11.2   11.38 )-----------( 210      ( 18 Dec 2023 05:30 )             35.5     12-18    135  |  99  |  8   ----------------------------<  124<H>  3.4<L>   |  25  |  0.78    Ca    8.3<L>      18 Dec 2023 05:30  Phos  2.1     12-18  Mg     2.5     12-18        Urinalysis Basic - ( 18 Dec 2023 05:30 )    Color: x / Appearance: x / SG: x / pH: x  Gluc: 124 mg/dL / Ketone: x  / Bili: x / Urobili: x   Blood: x / Protein: x / Nitrite: x   Leuk Esterase: x / RBC: x / WBC x   Sq Epi: x / Non Sq Epi: x / Bacteria: x      CAPILLARY BLOOD GLUCOSE            RADIOLOGY & ADDITIONAL STUDIES:

## 2023-12-19 NOTE — DISCHARGE NOTE PROVIDER - ATTENDING DISCHARGE PHYSICAL EXAMINATION:
Patient was seen and examined at bedside on 12/19/2023 at 1030 am. Patient reports feeling improved. Now passing gas and having a BM. Tolerating diet without issue. Denies SOB, CP, N/V. ROS is otherwise negative. Vitals, labwork and pertinent imaging reviewed. Exam - NAD, AAO x 4, PERRLA, EOMI, MMM, supple neck, chest - CTA b/l,, CV - rrr, s1s2, no m/r/g, abd - soft, distended, improved TTP in epigastric region, + BS, ext - wwp, psych - normal affect    Plan:  -Full liquid advanced to Regular diet  -Pain is well controlled  -Alcohol cessation discussed at length with patient, refused resources offered by PCM

## 2023-12-19 NOTE — DISCHARGE NOTE NURSING/CASE MANAGEMENT/SOCIAL WORK - NSDCPEFALRISK_GEN_ALL_CORE
For information on Fall & Injury Prevention, visit: [email or web address] OR  [email or web address] OR  https://www.cdc.gov/steadi/patient.html For information on Fall & Injury Prevention, visit: [email or web address] OR  [email or web address] OR  https://www.cdc.gov/steadi/patient.html

## 2023-12-20 LAB
VIT D25+D1,25 OH+D1,25 PNL SERPL-MCNC: 243.9 PG/ML — HIGH (ref 19.9–79.3)
VIT D25+D1,25 OH+D1,25 PNL SERPL-MCNC: 243.9 PG/ML — HIGH (ref 19.9–79.3)

## 2023-12-22 DIAGNOSIS — Z85.850 PERSONAL HISTORY OF MALIGNANT NEOPLASM OF THYROID: ICD-10-CM

## 2023-12-22 DIAGNOSIS — I10 ESSENTIAL (PRIMARY) HYPERTENSION: ICD-10-CM

## 2023-12-22 DIAGNOSIS — Z28.9 IMMUNIZATION NOT CARRIED OUT FOR UNSPECIFIED REASON: ICD-10-CM

## 2023-12-22 DIAGNOSIS — F10.10 ALCOHOL ABUSE, UNCOMPLICATED: ICD-10-CM

## 2023-12-22 DIAGNOSIS — Z79.899 OTHER LONG TERM (CURRENT) DRUG THERAPY: ICD-10-CM

## 2023-12-22 DIAGNOSIS — R73.03 PREDIABETES: ICD-10-CM

## 2023-12-22 DIAGNOSIS — E83.42 HYPOMAGNESEMIA: ICD-10-CM

## 2023-12-22 DIAGNOSIS — E83.51 HYPOCALCEMIA: ICD-10-CM

## 2023-12-22 DIAGNOSIS — E22.2 SYNDROME OF INAPPROPRIATE SECRETION OF ANTIDIURETIC HORMONE: ICD-10-CM

## 2023-12-22 DIAGNOSIS — Z79.890 HORMONE REPLACEMENT THERAPY: ICD-10-CM

## 2023-12-22 DIAGNOSIS — R14.0 ABDOMINAL DISTENSION (GASEOUS): ICD-10-CM

## 2023-12-22 DIAGNOSIS — F41.9 ANXIETY DISORDER, UNSPECIFIED: ICD-10-CM

## 2023-12-22 DIAGNOSIS — K56.7 ILEUS, UNSPECIFIED: ICD-10-CM

## 2023-12-22 DIAGNOSIS — T40.2X5A ADVERSE EFFECT OF OTHER OPIOIDS, INITIAL ENCOUNTER: ICD-10-CM

## 2023-12-22 DIAGNOSIS — K70.0 ALCOHOLIC FATTY LIVER: ICD-10-CM

## 2023-12-22 DIAGNOSIS — E78.5 HYPERLIPIDEMIA, UNSPECIFIED: ICD-10-CM

## 2023-12-22 DIAGNOSIS — E78.1 PURE HYPERGLYCERIDEMIA: ICD-10-CM

## 2023-12-22 DIAGNOSIS — K85.21 ALCOHOL INDUCED ACUTE PANCREATITIS WITH UNINFECTED NECROSIS: ICD-10-CM

## 2023-12-22 DIAGNOSIS — E89.0 POSTPROCEDURAL HYPOTHYROIDISM: ICD-10-CM

## 2023-12-22 DIAGNOSIS — R65.10 SYSTEMIC INFLAMMATORY RESPONSE SYNDROME (SIRS) OF NON-INFECTIOUS ORIGIN WITHOUT ACUTE ORGAN DYSFUNCTION: ICD-10-CM

## 2023-12-22 DIAGNOSIS — G47.00 INSOMNIA, UNSPECIFIED: ICD-10-CM

## 2024-08-15 NOTE — PROGRESS NOTE ADULT - PROBLEM SELECTOR PLAN 5
n/a Likely 2/2 Alcohol use.  -CT A/P: hepatosteatosis  -FIB-4: 1.25, therefore Low fibrosis score and is not appropriate candidate that requires liver biopsy    PLAN:  - continue to monitor AST/ALTs  - offer alcohol use cessation counseling (see below)